# Patient Record
Sex: MALE | Race: WHITE | NOT HISPANIC OR LATINO | URBAN - METROPOLITAN AREA
[De-identification: names, ages, dates, MRNs, and addresses within clinical notes are randomized per-mention and may not be internally consistent; named-entity substitution may affect disease eponyms.]

---

## 2017-01-11 ENCOUNTER — INPATIENT (INPATIENT)
Facility: HOSPITAL | Age: 62
LOS: 2 days | Discharge: HOME | DRG: 42 | End: 2017-01-14
Attending: INTERNAL MEDICINE | Admitting: INTERNAL MEDICINE
Payer: COMMERCIAL

## 2017-01-11 VITALS
DIASTOLIC BLOOD PRESSURE: 96 MMHG | HEART RATE: 57 BPM | TEMPERATURE: 98 F | SYSTOLIC BLOOD PRESSURE: 143 MMHG | WEIGHT: 145.06 LBS | HEIGHT: 66 IN | RESPIRATION RATE: 18 BRPM | OXYGEN SATURATION: 98 %

## 2017-01-11 DIAGNOSIS — I65.29 OCCLUSION AND STENOSIS OF UNSPECIFIED CAROTID ARTERY: ICD-10-CM

## 2017-01-11 DIAGNOSIS — G45.9 TRANSIENT CEREBRAL ISCHEMIC ATTACK, UNSPECIFIED: ICD-10-CM

## 2017-01-11 DIAGNOSIS — G81.91 HEMIPLEGIA, UNSPECIFIED AFFECTING RIGHT DOMINANT SIDE: ICD-10-CM

## 2017-01-11 DIAGNOSIS — C13.9 MALIGNANT NEOPLASM OF HYPOPHARYNX, UNSPECIFIED: ICD-10-CM

## 2017-01-11 DIAGNOSIS — I63.9 CEREBRAL INFARCTION, UNSPECIFIED: ICD-10-CM

## 2017-01-11 DIAGNOSIS — Z98.890 OTHER SPECIFIED POSTPROCEDURAL STATES: Chronic | ICD-10-CM

## 2017-01-11 DIAGNOSIS — Z87.891 PERSONAL HISTORY OF NICOTINE DEPENDENCE: ICD-10-CM

## 2017-01-11 DIAGNOSIS — Z91.041 RADIOGRAPHIC DYE ALLERGY STATUS: ICD-10-CM

## 2017-01-11 DIAGNOSIS — Z88.2 ALLERGY STATUS TO SULFONAMIDES: ICD-10-CM

## 2017-01-11 DIAGNOSIS — Z88.0 ALLERGY STATUS TO PENICILLIN: ICD-10-CM

## 2017-01-11 DIAGNOSIS — Z92.3 PERSONAL HISTORY OF IRRADIATION: ICD-10-CM

## 2017-01-11 DIAGNOSIS — N40.1 BENIGN PROSTATIC HYPERPLASIA WITH LOWER URINARY TRACT SYMPTOMS: ICD-10-CM

## 2017-01-11 LAB
ALBUMIN SERPL ELPH-MCNC: 4.5 G/DL — SIGNIFICANT CHANGE UP (ref 3.3–5.2)
ALP SERPL-CCNC: 72 U/L — SIGNIFICANT CHANGE UP (ref 40–120)
ALT FLD-CCNC: 20 U/L — SIGNIFICANT CHANGE UP
ANION GAP SERPL CALC-SCNC: 10 MMOL/L — SIGNIFICANT CHANGE UP (ref 5–17)
APTT BLD: 33.7 SEC — SIGNIFICANT CHANGE UP (ref 27.5–37.4)
AST SERPL-CCNC: 18 U/L — SIGNIFICANT CHANGE UP
BASOPHILS # BLD AUTO: 0 K/UL — SIGNIFICANT CHANGE UP (ref 0–0.2)
BASOPHILS NFR BLD AUTO: 1 % — SIGNIFICANT CHANGE UP (ref 0–2)
BILIRUB SERPL-MCNC: 0.9 MG/DL — SIGNIFICANT CHANGE UP (ref 0.4–2)
BUN SERPL-MCNC: 16 MG/DL — SIGNIFICANT CHANGE UP (ref 8–20)
CALCIUM SERPL-MCNC: 9.7 MG/DL — SIGNIFICANT CHANGE UP (ref 8.6–10.2)
CHLORIDE SERPL-SCNC: 103 MMOL/L — SIGNIFICANT CHANGE UP (ref 98–107)
CK SERPL-CCNC: 83 U/L — SIGNIFICANT CHANGE UP (ref 30–200)
CO2 SERPL-SCNC: 30 MMOL/L — HIGH (ref 22–29)
CREAT SERPL-MCNC: 0.61 MG/DL — SIGNIFICANT CHANGE UP (ref 0.5–1.3)
EOSINOPHIL # BLD AUTO: 0.1 K/UL — SIGNIFICANT CHANGE UP (ref 0–0.5)
EOSINOPHIL NFR BLD AUTO: 3 % — SIGNIFICANT CHANGE UP (ref 0–6)
GLUCOSE SERPL-MCNC: 100 MG/DL — SIGNIFICANT CHANGE UP (ref 70–115)
HBA1C BLD-MCNC: 5.7 — HIGH (ref 4–5.6)
HCT VFR BLD CALC: 44.3 % — SIGNIFICANT CHANGE UP (ref 42–52)
HGB BLD-MCNC: 15.4 G/DL — SIGNIFICANT CHANGE UP (ref 14–18)
INR BLD: 1.1 RATIO — SIGNIFICANT CHANGE UP (ref 0.88–1.16)
LYMPHOCYTES # BLD AUTO: 0.8 K/UL — LOW (ref 1–4.8)
LYMPHOCYTES # BLD AUTO: 19 % — LOW (ref 20–55)
MCHC RBC-ENTMCNC: 31.2 PG — HIGH (ref 27–31)
MCHC RBC-ENTMCNC: 34.8 G/DL — SIGNIFICANT CHANGE UP (ref 32–36)
MCV RBC AUTO: 89.7 FL — SIGNIFICANT CHANGE UP (ref 80–94)
MONOCYTES # BLD AUTO: 0.4 K/UL — SIGNIFICANT CHANGE UP (ref 0–0.8)
MONOCYTES NFR BLD AUTO: 10 % — SIGNIFICANT CHANGE UP (ref 3–10)
NEUTROPHILS # BLD AUTO: 2.6 K/UL — SIGNIFICANT CHANGE UP (ref 1.8–8)
NEUTROPHILS NFR BLD AUTO: 63 % — SIGNIFICANT CHANGE UP (ref 37–73)
PLATELET # BLD AUTO: 148 K/UL — LOW (ref 150–400)
POTASSIUM SERPL-MCNC: 4.8 MMOL/L — SIGNIFICANT CHANGE UP (ref 3.5–5.3)
POTASSIUM SERPL-SCNC: 4.8 MMOL/L — SIGNIFICANT CHANGE UP (ref 3.5–5.3)
PROT SERPL-MCNC: 7.1 G/DL — SIGNIFICANT CHANGE UP (ref 6.6–8.7)
PROTHROM AB SERPL-ACNC: 12.1 SEC — SIGNIFICANT CHANGE UP (ref 10–13.1)
RBC # BLD: 4.94 M/UL — SIGNIFICANT CHANGE UP (ref 4.6–6.2)
RBC # FLD: 13.3 % — SIGNIFICANT CHANGE UP (ref 11–15.6)
SODIUM SERPL-SCNC: 143 MMOL/L — SIGNIFICANT CHANGE UP (ref 135–145)
TROPONIN T SERPL-MCNC: <0.01 NG/ML — SIGNIFICANT CHANGE UP (ref 0–0.06)
TYPE + AB SCN PNL BLD: SIGNIFICANT CHANGE UP
WBC # BLD: 3.94 K/UL — LOW (ref 4.8–10.8)
WBC # FLD AUTO: 3.94 K/UL — LOW (ref 4.8–10.8)

## 2017-01-11 PROCEDURE — 71010: CPT | Mod: 26

## 2017-01-11 PROCEDURE — 93010 ELECTROCARDIOGRAM REPORT: CPT

## 2017-01-11 PROCEDURE — 99223 1ST HOSP IP/OBS HIGH 75: CPT

## 2017-01-11 PROCEDURE — 70450 CT HEAD/BRAIN W/O DYE: CPT | Mod: 26

## 2017-01-11 PROCEDURE — 70551 MRI BRAIN STEM W/O DYE: CPT | Mod: 26

## 2017-01-11 PROCEDURE — 99285 EMERGENCY DEPT VISIT HI MDM: CPT

## 2017-01-11 RX ORDER — ENOXAPARIN SODIUM 100 MG/ML
40 INJECTION SUBCUTANEOUS DAILY
Qty: 0 | Refills: 0 | Status: DISCONTINUED | OUTPATIENT
Start: 2017-01-11 | End: 2017-01-14

## 2017-01-11 RX ORDER — ASPIRIN/CALCIUM CARB/MAGNESIUM 324 MG
325 TABLET ORAL DAILY
Qty: 0 | Refills: 0 | Status: DISCONTINUED | OUTPATIENT
Start: 2017-01-11 | End: 2017-01-14

## 2017-01-11 RX ORDER — ATORVASTATIN CALCIUM 80 MG/1
40 TABLET, FILM COATED ORAL AT BEDTIME
Qty: 0 | Refills: 0 | Status: DISCONTINUED | OUTPATIENT
Start: 2017-01-11 | End: 2017-01-14

## 2017-01-11 RX ADMIN — ATORVASTATIN CALCIUM 40 MILLIGRAM(S): 80 TABLET, FILM COATED ORAL at 22:56

## 2017-01-11 RX ADMIN — ENOXAPARIN SODIUM 40 MILLIGRAM(S): 100 INJECTION SUBCUTANEOUS at 18:53

## 2017-01-11 RX ADMIN — Medication 325 MILLIGRAM(S): at 18:52

## 2017-01-11 RX ADMIN — Medication 1 MILLIGRAM(S): at 20:48

## 2017-01-11 NOTE — H&P ADULT. - ASSESSMENT
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     r/o CVA: stroke w/up ordered, adm stroke unit, neuro eval Dr Montes De Oca consulted. asa 325, statin.     Prophyl: lovenox

## 2017-01-11 NOTE — PATIENT PROFILE ADULT. - NS TRANSFER PATIENT BELONGINGS
Jewelry/Money (specify)/Clothing/wallet, vasyl stud earing, bracelet, argelia pack/Cell Phone/PDA (specify)

## 2017-01-11 NOTE — CONSULT NOTE ADULT - ASSESSMENT
The patient is a 61y Male with persistent right arm discoordination with concefrn for cerebellar CVA, not seen on CT head

## 2017-01-11 NOTE — DISCHARGE NOTE ADULT - CARE PLAN
Principal Discharge DX:	Cerebrovascular accident (CVA), unspecified mechanism  Goal:	to be fucntional at baseline  Instructions for follow-up, activity and diet:	Follow with PMD in 3 days. Follow with Cardiology  Secondary Diagnosis:	Benign prostatic hyperplasia, presence of lower urinary tract symptoms unspecified, unspecified morphology  Secondary Diagnosis:	Squamous cell cancer of hypopharynx

## 2017-01-11 NOTE — CONSULT NOTE ADULT - PROBLEM SELECTOR RECOMMENDATION 9
MRI head  echocardiogram  CT angio neck ordered  asa, lipitor  PT/OT  will follow  Andrei Montes De Oca MD, PhD   [id number]

## 2017-01-11 NOTE — DISCHARGE NOTE ADULT - PROVIDER TOKENS
FREE:[LAST:[PMD at [facility]],PHONE:[(   )    -],FAX:[(   )    -]],TOKEN:'[id number]',TOKEN:'[id number]'

## 2017-01-11 NOTE — ED PROVIDER NOTE - MEDICAL DECISION MAKING DETAILS
RUE numbness and weakness sudden onset improving in the ER.  Code stroke called.  No tPA given.  Patient admitted for TIA.

## 2017-01-11 NOTE — ED ADULT TRIAGE NOTE - CHIEF COMPLAINT QUOTE
Patient BIBA, patient states at 11:15AM had numbness and weakness to right arm, called 911, upon ED arrival NIH score of 0, states symptoms have resolved with just some tingling to right arm, denies headache or dizziness

## 2017-01-11 NOTE — DISCHARGE NOTE ADULT - HOSPITAL COURSE
PMH of Benign prostatic hyperplasia, presence of lower urinary tract symptoms unspecified, unspecified morphology Squamous cell cancer of hypopharynx. admitted with CVA confirmed by MRI as- Multiple acute small left MCA territory infarcts. DILIA confirmed PFO. got a ILR placed. on asa, plavix and statin. Medically stable and agreeable with discharge and follow up plan. Patient was advised to return to ED if any symptoms occur or worsen.

## 2017-01-11 NOTE — DISCHARGE NOTE ADULT - CARE PROVIDERS DIRECT ADDRESSES
,DirectAddress_Unknown,[email or web address],DirectAddress_Unknown,DirectAddress_Unknown

## 2017-01-11 NOTE — H&P ADULT. - PMH
Benign prostatic hyperplasia, presence of lower urinary tract symptoms unspecified, unspecified morphology    Squamous cell cancer of hypopharynx

## 2017-01-11 NOTE — H&P ADULT. - HISTORY OF PRESENT ILLNESS
61 y M hx squamous cell CA tonsil s/p xrt, neck dissection 2002, presented to ER c/o RUE weakness, paresthesias this am while at work. Pt reports had difficulty raising arm and felt numbness and tingling. Associated with lightheadedness. Denies F/C, CP, SOB, h/a, N/V/D. Presented to ER for further evaluation. Symptoms have improved however still has residual numbness of hand. He is followed by vascular surgeon Dr Logan for L carotid stenosis 50%.

## 2017-01-11 NOTE — DISCHARGE NOTE ADULT - CARE PROVIDER_API CALL
PMD at [facility],   Phone: (   )    -  Fax: (   )    -    Chente Alford), Cardiovascular Disease  [address] [place] [address]  Phone: [phone]  Fax: [phone]    Matthew Moya), Neurology  [address]  [place] [address]  Phone: [phone]  Fax: [phone]

## 2017-01-11 NOTE — ED PROVIDER NOTE - OBJECTIVE STATEMENT
This patient is a 61 year old man who presents to the ED c/o right arm weakness and numbness lasting for about 30 minutes that began suddenly at 11:15 am.  Patient states that he was about to start work at [employer] when the symptoms began.  He denies headache, visual changes or deficits, neck pain, and vomiting.  Patient states that he has never had symptoms like this in the past.  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  Currently in the ER he states that his symptoms are improving but he still has some weakness and numbness to the hand.  He reports that he noticed it was difficult to write when he signed in to the ER.

## 2017-01-11 NOTE — ED PROVIDER NOTE - PROGRESS NOTE DETAILS
Delay in taking patient to CT scan  No nurses at bedside to take patient. I spoke with Dr. Thomas who states that patient is not a candidate for tPA.  He does recommend that patient have CTA of head/neck

## 2017-01-11 NOTE — H&P ADULT. - NEUROLOGICAL DETAILS
gait not assessed, toes downgoing/sensation intact/cranial nerves intact/alert and oriented x 3/normal strength cranial nerves intact/gait not assessed/normal strength/sensation intact/alert and oriented x 3

## 2017-01-11 NOTE — ED ADULT NURSE NOTE - OBJECTIVE STATEMENT
Patient BIBA, patient states at 11:15AM had numbness and weakness to right arm, called 911, upon ED arrival NIH score of 0, states symptoms have resolved with just some tingling to right arm, denies headache or dizziness, c/o mild numbness to right hand fingertips , but has full ROM

## 2017-01-11 NOTE — DISCHARGE NOTE ADULT - MEDICATION SUMMARY - MEDICATIONS TO TAKE
I will START or STAY ON the medications listed below when I get home from the hospital:    aspirin 325 mg oral tablet  -- 1 tab(s) by mouth once a day  -- Indication: For cva    atorvastatin 40 mg oral tablet  -- 1 tab(s) by mouth once a day (at bedtime)  -- Indication: For cva    clopidogrel 75 mg oral tablet  -- 1 tab(s) by mouth once a day  -- Indication: For cva + pfo I will START or STAY ON the medications listed below when I get home from the hospital:    To Whomsoever concerned  -- Patient has been hospitalized at [facility] from  1/11/17 to date. He can resume his regular activities as before from 1/17/17. Please extend your consideration to the patient and family. Feel free to contact me with any questions.    -- Indication: For note    aspirin 325 mg oral tablet  -- 1 tab(s) by mouth once a day  -- Indication: For Cva    atorvastatin 40 mg oral tablet  -- 1 tab(s) by mouth once a day (at bedtime)  -- Indication: For Cva    clopidogrel 75 mg oral tablet  -- 1 tab(s) by mouth once a day  -- Indication: For Cva + pfo

## 2017-01-11 NOTE — DISCHARGE NOTE ADULT - PATIENT PORTAL LINK FT
“You can access the [facility] Patient Portal, offered by [facility], by registering with the following website: [email or web address]”

## 2017-01-12 DIAGNOSIS — I63.9 CEREBRAL INFARCTION, UNSPECIFIED: ICD-10-CM

## 2017-01-12 LAB
CHOLEST SERPL-MCNC: 172 MG/DL — SIGNIFICANT CHANGE UP (ref 110–199)
HCV AB S/CO SERPL IA: 0.07 S/CO — SIGNIFICANT CHANGE UP
HCV AB SERPL-IMP: SIGNIFICANT CHANGE UP
HDLC SERPL-MCNC: 63 MG/DL — SIGNIFICANT CHANGE UP
LIPID PNL WITH DIRECT LDL SERPL: 95 MG/DL — SIGNIFICANT CHANGE UP
TOTAL CHOLESTEROL/HDL RATIO MEASUREMENT: 3 RATIO — LOW (ref 3.4–9.6)
TRIGL SERPL-MCNC: 70 MG/DL — SIGNIFICANT CHANGE UP (ref 10–200)

## 2017-01-12 PROCEDURE — 99222 1ST HOSP IP/OBS MODERATE 55: CPT | Mod: 25

## 2017-01-12 PROCEDURE — 99233 SBSQ HOSP IP/OBS HIGH 50: CPT

## 2017-01-12 PROCEDURE — 93306 TTE W/DOPPLER COMPLETE: CPT | Mod: 26

## 2017-01-12 PROCEDURE — 99253 IP/OBS CNSLTJ NEW/EST LOW 45: CPT

## 2017-01-12 RX ORDER — ACETAMINOPHEN 500 MG
650 TABLET ORAL EVERY 6 HOURS
Qty: 0 | Refills: 0 | Status: DISCONTINUED | OUTPATIENT
Start: 2017-01-12 | End: 2017-01-14

## 2017-01-12 RX ADMIN — Medication 325 MILLIGRAM(S): at 11:35

## 2017-01-12 RX ADMIN — ENOXAPARIN SODIUM 40 MILLIGRAM(S): 100 INJECTION SUBCUTANEOUS at 11:35

## 2017-01-12 RX ADMIN — ATORVASTATIN CALCIUM 40 MILLIGRAM(S): 80 TABLET, FILM COATED ORAL at 21:20

## 2017-01-12 NOTE — PHYSICAL THERAPY INITIAL EVALUATION ADULT - ADDITIONAL COMMENTS
Pt lives with wife in a 2 story house with 2 steps to enter. Independent with all PTA, without devices.

## 2017-01-12 NOTE — PROGRESS NOTE ADULT - SUBJECTIVE AND OBJECTIVE BOX
INTERVAL HPI/OVERNIGHT EVENTS:      CC: acute stroke, carotid stenosis    Chart and course reviewed. Admitted yesterday for right arm weakness, improving at this time. MRI prelim report discussed with patient.     Vital Signs Last 24 Hrs  T(C): 36.6, Max: 37 (01-11 @ 19:55)  T(F): 97.8, Max: 98.6 (01-11 @ 19:55)  HR: 60 (54 - 62)  BP: 117/68 (102/82 - 140/67)  BP(mean): 89 (81 - 105)  RR: 12 (12 - 18)  SpO2: 98% (94% - 100%)    PHYSICAL EXAM:    GENERAL: Not in distress, alert  CHEST/LUNG: b/l air entry  HEART: regular  ABDOMEN: Soft, BS +  EXTREMITIES:  no edema    MEDICATIONS  (STANDING):  aspirin 325milliGRAM(s) Oral daily  enoxaparin Injectable 40milliGRAM(s) SubCutaneous daily  atorvastatin 40milliGRAM(s) Oral at bedtime  methylPREDNISolone 32milliGRAM(s) Oral once  methylPREDNISolone 32milliGRAM(s) Oral once    MEDICATIONS  (PRN):  acetaminophen   Tablet. 650milliGRAM(s) Oral every 6 hours PRN Moderate Pain (4 - 6)      Allergies    IV Contrast (Unknown)  penicillins (Unknown)  sulfa drugs (Unknown)    Intolerances          LABS:                          15.4   3.94  )-----------( 148      ( 11 Jan 2017 14:30 )             44.3     11 Jan 2017 14:30    143    |  103    |  16.0   ----------------------------<  100    4.8     |  30.0   |  0.61     Ca    9.7        11 Jan 2017 14:30    TPro  7.1    /  Alb  4.5    /  TBili  0.9    /  DBili  x      /  AST  18     /  ALT  20     /  AlkPhos  72     11 Jan 2017 14:30    PT/INR - ( 11 Jan 2017 19:24 )   PT: 12.1 sec;   INR: 1.10 ratio         PTT - ( 11 Jan 2017 19:24 )  PTT:33.7 sec      RADIOLOGY & ADDITIONAL TESTS:

## 2017-01-12 NOTE — CONSULT NOTE ADULT - ASSESSMENT
Assessment and Plan:      61 y M hx  L carotid stenosis 50%. (11 Jan 2017 16:08), squamous cell CA,  tonsil s/p xrt, neck dissection 2002, presented to ER c/o RUE weakness, paresthesias. Symptoms have improved .  MRI brain showed  small punctate CVAs in left MCA distribution. Neurology recommended  TTE, DILIA and ILR.  He denied dyspnea, orthopnea, PND, edema, CP, palpitation, syncope, near syncope.    CVA  Carotid Artery Stenosis    >Carotid Atery  Imaging is already ordered  >ASA  >Statin  >DILIA: Alternative including no treatment options, benefits, risks including but not limited perforation, hoarseness bleeding, dysphagia discussed with the patinet. All questions were uanswered. He understood well, He agreed for DILIA. Will arrange for tomorrow.  >ILR to exclude ischaemia EP Consultation requested.

## 2017-01-12 NOTE — CONSULT NOTE ADULT - SUBJECTIVE AND OBJECTIVE BOX
Patient is a 61y old  Male who presents with a chief complaint of RUE weakness (11 Jan 2017 23:05)      HPI:  61 y M hx squamous cell CA tonsil s/p xrt, neck dissection 2002, presented to ER c/o RUE weakness, paresthesias this am while at work. Pt reports had difficulty raising arm and felt numbness and tingling. Associated with lightheadedness. Denies F/C, CP, SOB, h/a, N/V/D; syncope or pre-syncope . Presented to ER for further evaluation. Symptoms have improved however still has residual numbness of hand. Symptoms have improved . Hospital work-up consistent with likely CVA MRI brain showed  small punctate CVAs in left MCA distribution. Neurology recommended  TTE, DILIA and possible  ILR.      PAST MEDICAL & SURGICAL HISTORY:  Benign prostatic hyperplasia, presence of lower urinary tract symptoms unspecified, unspecified morphology  Squamous cell cancer of hypopharynx  History of prostate surgery  H/O arthroscopic knee surgery  Status post neck dissection      PREVIOUS DIAGNOSTIC TESTING:      EKG: SB  first AVB    ECHO  FINDINGS: Pending    STRESS  FINDINGS: N/A          Allergies    IV Contrast (Unknown)  penicillins (Unknown)  sulfa drugs (Unknown)      MEDICATIONS  (STANDING):  aspirin 325milliGRAM(s) Oral daily  enoxaparin Injectable 40milliGRAM(s) SubCutaneous daily  atorvastatin 40milliGRAM(s) Oral at bedtime  methylPREDNISolone 32milliGRAM(s) Oral once  methylPREDNISolone 32milliGRAM(s) Oral once    MEDICATIONS  (PRN):  acetaminophen   Tablet. 650milliGRAM(s) Oral every 6 hours PRN Moderate Pain (4 - 6)      FAMILY HISTORY:  Family history of lung cancer (Father)      SOCIAL HISTORY: Lives with family    CIGARETTES: None    ALCOHOL: social    REVIEW OF SYSTEMS:  CONSTITUTIONAL: No fever, weight loss, or fatigue  EYES: No eye pain, visual disturbances, or discharge  ENMT:  No difficulty hearing, tinnitus, vertigo; No sinus or throat pain  NECK: No pain or stiffness  RESPIRATORY: No cough, wheezing, chills or hemoptysis; No Shortness of Breath  CARDIOVASCULAR: No chest pain, palpitations, passing out, dizziness, or leg swelling  GASTROINTESTINAL: No abdominal or epigastric pain. No nausea, vomiting, or hematemesis; No diarrhea or constipation. No melena or hematochezia.  GENITOURINARY: No dysuria, frequency, hematuria, or incontinence  NEUROLOGICAL: No headaches, memory loss, loss of strength, numbness, or tremors  SKIN: No itching, burning, rashes, or lesions   LYMPH Nodes: No enlarged glands  ENDOCRINE: No heat or cold intolerance; No hair loss  MUSCULOSKELETAL: No joint pain or swelling; No muscle, back, or extremity pain  PSYCHIATRIC: No depression, anxiety, mood swings, or difficulty sleeping  HEME/LYMPH: No easy bruising, or bleeding gums  ALLERY AND IMMUNOLOGIC: No hives or eczema	    Vital Signs Last 24 Hrs  T(C): 36.9, Max: 37 (01-11 @ 19:55)  T(F): 98.4, Max: 98.6 (01-11 @ 19:55)  HR: 55 (54 - 60)  BP: 125/78 (102/82 - 139/71)  BP(mean): 92 (81 - 105)  RR: 14 (12 - 18)  SpO2: 97% (94% - 100%)    Daily     Daily     I&O's Detail      PHYSICAL EXAM:  Appearance: Normal, well nourished	  HEENT:   Normal oral mucosa, PERRL, EOMI, sclera non-icteric	  Lymphatic: No cervical lymphadenopathy  Cardiovascular: Normal S1 S2, No JVD, No cardiac murmurs, No carotid bruits, No peripheral edema  Respiratory: Lungs clear to auscultation	  Psychiatry: A & O x 3, Mood & affect appropriate  Gastrointestinal:  Soft, Non-tender, + BS, no bruits	  Skin: No rashes, No ecchymoses, No cyanosis  Neurologic: Grossly non-focal with full strength in all four extremities  Extremities: Normal range of motion, No clubbing, cyanosis or edema  Vascular: Peripheral pulses palpable 2+ bilaterally      INTERPRETATION OF TELEMETRY:    ECG: SR with 1st AVB    LABS:                        15.4   3.94  )-----------( 148      ( 11 Jan 2017 14:30 )             44.3     11 Jan 2017 14:30    143    |  103    |  16.0   ----------------------------<  100    4.8     |  30.0   |  0.61     Ca    9.7        11 Jan 2017 14:30    TPro  7.1    /  Alb  4.5    /  TBili  0.9    /  DBili  x      /  AST  18     /  ALT  20     /  AlkPhos  72     11 Jan 2017 14:30    CARDIAC MARKERS ( 11 Jan 2017 14:30 )  x     / <0.01 ng/mL / 83 U/L / x     / x          PT/INR - ( 11 Jan 2017 19:24 )   PT: 12.1 sec;   INR: 1.10 ratio         PTT - ( 11 Jan 2017 19:24 )  PTT:33.7 sec    I&O's Summary    BNP  RADIOLOGY & ADDITIONAL STUDIES:    Head CT    No acute intracranial findings.  Mild atrophy and chronic white matter microvascular ischemic changes as   described. Focal asymmetric area of hypodensity in the left frontal lobe   nonhemorrhagic.    Head MRI    Multiple acute small left MCA territory infarcts. Preliminary   report provided by  [facility] Radiologist: Tatiana who spoke with Dr. Griffith and   findings were discussed on 1/11/2017 at 9:58 PM EST .   A/p     61 y M hx  L carotid stenosis 50%. (11 Jan 2017 16:08), squamous cell CA,  tonsil s/p xrt, neck dissection 2002, presented to ER c/o RUE weakness, paresthesias. Symptoms have improved  MRI brain showed  small punctate CVAs in left MCA distribution currently hemodynamically stable.      >Carotid Artery  Imaging pending  >ASA  >Statin  >DILIA:  Pending   If above work-up unremarkable would  recommend LINQ monitor for further risk stratification
CARDIOLOGY CONSULTATION NOTE     History obtained by: Patient, family and medical record     obtained: No    Chief complaint:    Patient is a 61y old  Male who presents with a chief complaint of RUE weakness (11 Jan 2017 23:05)      HPI:    61 y M hx  L carotid stenosis 50%. (11 Jan 2017 16:08), squamous cell CA,  tonsil s/p xrt, neck dissection 2002, presented to ER c/o RUE weakness, paresthesias. Symptoms have improved .  MRI brain showed  small punctate CVAs in left MCA distribution. Neurology recommended  TTE, DILIA and ILR.  He denied dyspnea, orthopnea, PND, edema, CP, palpitation, syncope, near syncope.          REVIEW OF SYMPTOMS:   Constitutional: Denied: fever, chills, weight loss or gain  Eyes: Denied: reddened ayes, eye discharge, eye pain  ENMT: Denied: ear pain, nasal discharge, mouth pain, throat pain or swelling  Cardiovascular: Denied: chest pain,  Chest pressure, palpitation, arrhythmia, irregular or fast heart beats, edema  Respiratory: Denied: cough, phlegm production, wheezes, dyspnea, orthopnea, PND,   GI: Denied: Abdominal pain, nausea, vomiting, diarrhea, constipation, melena, rectal bleed  : Denied: hematuria, frequency  Musculoskeletal: Denied: Muscle aches, weakness, pain  Hematology/lymp: Denied: Bleeding disorder, anemia, blood clotting  Neuro: Denied: Headache, light headedness, dizziness, numbness, aphasia, dysarthria, seizure,  syncope, near syncope  Psych: Denied: depression, anxiety  Integumentary/skin: Denied: rash, bruises, ecchymosis, itching  Allergy/Immunology: denied environmental or drug allergies    ALL OTHER REVIEW OF SYSTEMS ARE NEGATIVE.    MEDICATIONS  (STANDING):  aspirin 325milliGRAM(s) Oral daily  enoxaparin Injectable 40milliGRAM(s) SubCutaneous daily  atorvastatin 40milliGRAM(s) Oral at bedtime  methylPREDNISolone 32milliGRAM(s) Oral once  methylPREDNISolone 32milliGRAM(s) Oral once    MEDICATIONS  (PRN):  acetaminophen   Tablet. 650milliGRAM(s) Oral every 6 hours PRN Moderate Pain (4 - 6)        PAST MEDICAL & SURGICAL HISTORY:  Benign prostatic hyperplasia, presence of lower urinary tract symptoms unspecified, unspecified morphology  Squamous cell cancer of hypopharynx  History of prostate surgery  H/O arthroscopic knee surgery  Status post neck dissection      FAMILY HISTORY:  Family history of lung cancer (Father)      SOCIAL HISTORY:    CIGARETTES:  No    ALCOHOL: No    DRUGS: No    Vital Signs Last 24 Hrs  T(C): 36.9, Max: 37 (01-11 @ 19:55)  T(F): 98.4, Max: 98.6 (01-11 @ 19:55)  HR: 55 (54 - 60)  BP: 125/78 (102/82 - 139/71)  BP(mean): 92 (81 - 105)  RR: 14 (12 - 18)  SpO2: 97% (94% - 100%)    PHYSICAL EXAM:      Constitutional: No fever, chills, NAD, Comfortable    Eyes: Not reddened, no discharge    ENMT: No discharge, No pain    Neck: No JVD, No Bruit    Back: No CVA tenderness    Respiratory: Clear to auscultation bilaterally    Cardiovascular: RRR, Normal S1-2, No S3-, No friction rub murmur    Gastrointestinal: Soft, NT/ND. BS+, No organomegaly    Extremities: No edema    Vascular: Distal pulses intact    Neurological: Alert, awake, oriented, able to move extremities, speach is clear    Skin: No ecchymosis, no rash    Musculoskeletal: Non tender,    Psychiatric: Aproppriate mood, no anxiety        INTERPRETATION OF TELEMETRY:    ECG: SR, 1st degree AV Block,  msc    I&O's Detail      LABS:                        15.4   3.94  )-----------( 148      ( 11 Jan 2017 14:30 )             44.3     11 Jan 2017 14:30    143    |  103    |  16.0   ----------------------------<  100    4.8     |  30.0   |  0.61     Ca    9.7        11 Jan 2017 14:30    TPro  7.1    /  Alb  4.5    /  TBili  0.9    /  DBili  x      /  AST  18     /  ALT  20     /  AlkPhos  72     11 Jan 2017 14:30    CARDIAC MARKERS ( 11 Jan 2017 14:30 )  x     / <0.01 ng/mL / 83 U/L / x     / x          PT/INR - ( 11 Jan 2017 19:24 )   PT: 12.1 sec;   INR: 1.10 ratio         PTT - ( 11 Jan 2017 19:24 )  PTT:33.7 sec    I&O's Summary      RADIOLOGY & ADDITIONAL STUDIES:  X-ray:    PREVIOUS DIAGNOSTIC TESTING:
[facility]                                 Falguni Mccabe, & Amadou                                  [address]                                        [place], [place], [address]                                             [phone]    HISTORY:    The patient is a 61y Male who was at work and noted that he had difficulty controlling his right arm, it is significantly better now, but he still has some issues with it.  He did not have paresthesia or speech/language issues.    PAST MEDICAL & SURGICAL HISTORY:  Benign prostatic hyperplasia, presence of lower urinary tract symptoms unspecified, unspecified morphology  Squamous cell cancer of hypopharynx  History of prostate surgery  H/O arthroscopic knee surgery  Status post neck dissection      MEDICATIONS  (STANDING):  aspirin 325milliGRAM(s) Oral daily  enoxaparin Injectable 40milliGRAM(s) SubCutaneous daily  atorvastatin 40milliGRAM(s) Oral at bedtime    MEDICATIONS  (PRN):      Allergies    IV Contrast (Unknown)  penicillins (Unknown)  sulfa drugs (Unknown)    Intolerances  none      SOCIAL HISTORY:  no tob/etoh/drugs    FAMILY HISTORY:  Family history of lung cancer (Father)      ROS:  The patient denies fevers or weight changes.  Denies headache or dizziness.  Denies chest pain.  Denies shortness of breath.  Denies abdominal pain, nausea, or vomiting.  Denies change in urinary pattern.  Denies rash.  Denies recent mood changes.    Exam:  Vital Signs Last 24 Hrs  T(C): 36.7, Max: 36.7 (01-11 @ 12:12)  T(F): 98.1, Max: 98.1 (01-11 @ 12:12)  HR: 62 (57 - 62)  BP: 140/67 (140/67 - 143/96)  BP(mean): --  RR: 18 (18 - 18)  SpO2: 99% (98% - 99%)  General: NAD    Mental status: The patient is awake, alert, and fully oriented. There is no aphasia.    Cranial nerves: . Pupils react Symmetrically to light. There is no visual field deficit to confrontation. Extraocular motion is full with no nystagmus. There is no ptosis. Facial sensation is intact. Facial musculature is symmetric. Palate elevates symmetrically. Tongue is midline.    Motor: There is normal bulk and tone.  Strength is 5/5 in the right arm and leg.   Strength is 5/5 in the left arm and leg.    Sensation: Intact to light touch and pin.    Reflexes: 2+ throughout and plantar responses are flexor.    Cerebellar: There is mild dysmetria on finger to nose testing on right, normal left    LABS:                         15.4   3.94  )-----------( 148      ( 11 Jan 2017 14:30 )             44.3       11 Jan 2017 14:30    143    |  103    |  16.0   ----------------------------<  100    4.8     |  30.0   |  0.61     Ca    9.7        11 Jan 2017 14:30    TPro  7.1    /  Alb  4.5    /  TBili  0.9    /  DBili  x      /  AST  18     /  ALT  20     /  AlkPhos  72     11 Jan 2017 14:30      PT/INR - ( 11 Jan 2017 19:24 )   PT: 12.1 sec;   INR: 1.10 ratio         PTT - ( 11 Jan 2017 19:24 )  PTT:33.7 sec    RADIOLOGY & ADDITIONAL STUDIES:  CT head no acute CVA, mass or bleed

## 2017-01-12 NOTE — PROGRESS NOTE ADULT - ASSESSMENT
CVA  await TTE, may need DILIA and ILR  ordered carotid duplex    ASA, statin  will follow with you    Andrei Montes De Oca MD PhD   [id number]

## 2017-01-12 NOTE — PROGRESS NOTE ADULT - ASSESSMENT
61 yr old male with prior history of head and neck ca, carotid stenosis admitted with right arm weakness. MRI positive for stroke.

## 2017-01-12 NOTE — OCCUPATIONAL THERAPY INITIAL EVALUATION ADULT - ADDITIONAL COMMENTS
Pt lives in 2 story home with 2 steps to enter.  Pt has tub/shower combo.  No assistive devices prior to admission.

## 2017-01-12 NOTE — PROGRESS NOTE ADULT - SUBJECTIVE AND OBJECTIVE BOX
[facility]                                 Falguni Mccabe, & Amadou                                  [address]                                        [place], [place], [address]                                             [phone]      Vital signs:  T(C): 36.6, Max: 37 (01-11 @ 19:55)  HR: 59 (54 - 62)  BP: 102/82 (102/82 - 143/96)  RR: 12 (12 - 18)  SpO2: 95% (94% - 100%)  Wt(kg): --    Exam:    No new complaints.  Awake and alert.  speech language intact  Pupils react.  Face symmetric smile and sensation  hearing symmetric  tongue ML  5/5 power in 4 ext  no drift  intact FT x 4 ext  mild dysmetria on right FTN improved    MRI brain small punctate CVAs in left MCA distribution

## 2017-01-13 PROCEDURE — 93312 ECHO TRANSESOPHAGEAL: CPT | Mod: 26

## 2017-01-13 PROCEDURE — 93880 EXTRACRANIAL BILAT STUDY: CPT | Mod: 26

## 2017-01-13 PROCEDURE — 70498 CT ANGIOGRAPHY NECK: CPT | Mod: 26

## 2017-01-13 PROCEDURE — 99233 SBSQ HOSP IP/OBS HIGH 50: CPT

## 2017-01-13 PROCEDURE — 70496 CT ANGIOGRAPHY HEAD: CPT | Mod: 26

## 2017-01-13 PROCEDURE — 76376 3D RENDER W/INTRP POSTPROCES: CPT | Mod: 26

## 2017-01-13 PROCEDURE — 33282: CPT

## 2017-01-13 PROCEDURE — 93320 DOPPLER ECHO COMPLETE: CPT | Mod: 26

## 2017-01-13 PROCEDURE — 93325 DOPPLER ECHO COLOR FLOW MAPG: CPT | Mod: 26

## 2017-01-13 RX ADMIN — ATORVASTATIN CALCIUM 40 MILLIGRAM(S): 80 TABLET, FILM COATED ORAL at 21:16

## 2017-01-13 RX ADMIN — Medication 32 MILLIGRAM(S): at 13:53

## 2017-01-13 RX ADMIN — ENOXAPARIN SODIUM 40 MILLIGRAM(S): 100 INJECTION SUBCUTANEOUS at 12:18

## 2017-01-13 RX ADMIN — Medication 325 MILLIGRAM(S): at 12:18

## 2017-01-13 RX ADMIN — Medication 32 MILLIGRAM(S): at 04:09

## 2017-01-13 RX ADMIN — Medication 100 MILLIGRAM(S): at 09:45

## 2017-01-13 NOTE — PROGRESS NOTE ADULT - SUBJECTIVE AND OBJECTIVE BOX
INTERVAL HPI/OVERNIGHT EVENTS:      CC: stroke, head and neck ca    No overnight events, no new complaints.      Vital Signs Last 24 Hrs  T(C): 36.6, Max: 36.6 (01-13 @ 04:24)  T(F): 97.8, Max: 97.8 (01-13 @ 04:24)  HR: 57 (50 - 63)  BP: 119/70 (101/56 - 125/78)  BP(mean): 82 (79 - 92)  RR: 18 (14 - 20)  SpO2: 97% (97% - 99%)    PHYSICAL EXAM:    GENERAL: Not in distress, alert  CHEST/LUNG: b/l air entry  HEART: Regular  ABDOMEN: Soft, BS +  EXTREMITIES:  No edema    MEDICATIONS  (STANDING):  aspirin 325milliGRAM(s) Oral daily  enoxaparin Injectable 40milliGRAM(s) SubCutaneous daily  atorvastatin 40milliGRAM(s) Oral at bedtime  methylPREDNISolone 32milliGRAM(s) Oral once    MEDICATIONS  (PRN):  acetaminophen   Tablet. 650milliGRAM(s) Oral every 6 hours PRN Moderate Pain (4 - 6)      Allergies    IV Contrast (Unknown)  penicillins (Unknown)  sulfa drugs (Unknown)    Intolerances          LABS:                          15.4   3.94  )-----------( 148      ( 11 Jan 2017 14:30 )             44.3     11 Jan 2017 14:30    143    |  103    |  16.0   ----------------------------<  100    4.8     |  30.0   |  0.61     Ca    9.7        11 Jan 2017 14:30    TPro  7.1    /  Alb  4.5    /  TBili  0.9    /  DBili  x      /  AST  18     /  ALT  20     /  AlkPhos  72     11 Jan 2017 14:30    PT/INR - ( 11 Jan 2017 19:24 )   PT: 12.1 sec;   INR: 1.10 ratio         PTT - ( 11 Jan 2017 19:24 )  PTT:33.7 sec      RADIOLOGY & ADDITIONAL TESTS:

## 2017-01-13 NOTE — PROGRESS NOTE ADULT - ATTENDING COMMENTS
Plan of care discussed at length with patient. All questions answered.
Plan of care discussed with patient and RN.

## 2017-01-13 NOTE — PROGRESS NOTE ADULT - PROBLEM SELECTOR PLAN 1
Continue Aspirin and statin. Cardiology evaluation noted, for DILIA and possible ILR placement today. Awaiting CTA neck. PT/OT evaluation noted.
CTA head and neck ordered, Medrol ordered given contrast allergy. Monitor on stroke unit. Continue aspirin and plavix. ECHO. Will consult cardiology for ILR and possible DILIA. He reports having occasional weakness.

## 2017-01-13 NOTE — PROGRESS NOTE ADULT - ASSESSMENT
61 yr old male with prior history of head and neck ca, carotid stenosis admitted with right arm weakness. MRI positive for stroke. Cardiology consulted foe DILIA and possible ILR placement.

## 2017-01-13 NOTE — PROGRESS NOTE ADULT - SUBJECTIVE AND OBJECTIVE BOX
Procedure Note:    DILIA    Informed consent obtained.  Sedation is provided by anesthesia.  Probe passed without difficulty.  Patient tolerated procedure well.  No complication noted.

## 2017-01-14 VITALS — TEMPERATURE: 98 F

## 2017-01-14 LAB — HBA1C BLD-MCNC: 5.6 — SIGNIFICANT CHANGE UP (ref 4–5.6)

## 2017-01-14 PROCEDURE — 71045 X-RAY EXAM CHEST 1 VIEW: CPT

## 2017-01-14 PROCEDURE — 93312 ECHO TRANSESOPHAGEAL: CPT

## 2017-01-14 PROCEDURE — 70551 MRI BRAIN STEM W/O DYE: CPT

## 2017-01-14 PROCEDURE — 93306 TTE W/DOPPLER COMPLETE: CPT

## 2017-01-14 PROCEDURE — 70496 CT ANGIOGRAPHY HEAD: CPT

## 2017-01-14 PROCEDURE — 83036 HEMOGLOBIN GLYCOSYLATED A1C: CPT

## 2017-01-14 PROCEDURE — 97165 OT EVAL LOW COMPLEX 30 MIN: CPT

## 2017-01-14 PROCEDURE — 86901 BLOOD TYPING SEROLOGIC RH(D): CPT

## 2017-01-14 PROCEDURE — 70450 CT HEAD/BRAIN W/O DYE: CPT

## 2017-01-14 PROCEDURE — 85610 PROTHROMBIN TIME: CPT

## 2017-01-14 PROCEDURE — 93005 ELECTROCARDIOGRAM TRACING: CPT

## 2017-01-14 PROCEDURE — 80061 LIPID PANEL: CPT

## 2017-01-14 PROCEDURE — 80053 COMPREHEN METABOLIC PANEL: CPT

## 2017-01-14 PROCEDURE — 93880 EXTRACRANIAL BILAT STUDY: CPT

## 2017-01-14 PROCEDURE — 36415 COLL VENOUS BLD VENIPUNCTURE: CPT

## 2017-01-14 PROCEDURE — 85730 THROMBOPLASTIN TIME PARTIAL: CPT

## 2017-01-14 PROCEDURE — 82550 ASSAY OF CK (CPK): CPT

## 2017-01-14 PROCEDURE — 86850 RBC ANTIBODY SCREEN: CPT

## 2017-01-14 PROCEDURE — 99285 EMERGENCY DEPT VISIT HI MDM: CPT | Mod: 25

## 2017-01-14 PROCEDURE — 97163 PT EVAL HIGH COMPLEX 45 MIN: CPT

## 2017-01-14 PROCEDURE — 70498 CT ANGIOGRAPHY NECK: CPT

## 2017-01-14 PROCEDURE — 99239 HOSP IP/OBS DSCHRG MGMT >30: CPT

## 2017-01-14 PROCEDURE — C1764: CPT

## 2017-01-14 PROCEDURE — 86803 HEPATITIS C AB TEST: CPT

## 2017-01-14 PROCEDURE — 86900 BLOOD TYPING SEROLOGIC ABO: CPT

## 2017-01-14 PROCEDURE — 84484 ASSAY OF TROPONIN QUANT: CPT

## 2017-01-14 PROCEDURE — 93325 DOPPLER ECHO COLOR FLOW MAPG: CPT

## 2017-01-14 PROCEDURE — 85027 COMPLETE CBC AUTOMATED: CPT

## 2017-01-14 PROCEDURE — 93320 DOPPLER ECHO COMPLETE: CPT

## 2017-01-14 RX ORDER — CLOPIDOGREL BISULFATE 75 MG/1
75 TABLET, FILM COATED ORAL DAILY
Qty: 0 | Refills: 0 | Status: DISCONTINUED | OUTPATIENT
Start: 2017-01-14 | End: 2017-01-14

## 2017-01-14 RX ORDER — CLOPIDOGREL BISULFATE 75 MG/1
1 TABLET, FILM COATED ORAL
Qty: 30 | Refills: 0 | OUTPATIENT
Start: 2017-01-14

## 2017-01-14 RX ORDER — ATORVASTATIN CALCIUM 80 MG/1
1 TABLET, FILM COATED ORAL
Qty: 30 | Refills: 0 | OUTPATIENT
Start: 2017-01-14

## 2017-01-14 RX ORDER — ASPIRIN/CALCIUM CARB/MAGNESIUM 324 MG
1 TABLET ORAL
Qty: 30 | Refills: 0 | OUTPATIENT
Start: 2017-01-14

## 2017-01-14 RX ADMIN — Medication 325 MILLIGRAM(S): at 11:58

## 2017-01-14 RX ADMIN — Medication 650 MILLIGRAM(S): at 04:53

## 2017-01-14 RX ADMIN — Medication 650 MILLIGRAM(S): at 06:24

## 2017-01-14 RX ADMIN — CLOPIDOGREL BISULFATE 75 MILLIGRAM(S): 75 TABLET, FILM COATED ORAL at 17:11

## 2017-01-14 RX ADMIN — ENOXAPARIN SODIUM 40 MILLIGRAM(S): 100 INJECTION SUBCUTANEOUS at 11:58

## 2017-01-14 NOTE — PROGRESS NOTE ADULT - SUBJECTIVE AND OBJECTIVE BOX
[facility]                                 Falguni Mccabe, & Amadou                                  [address]                                        [place], [place], [address]                                             [phone]        No new complaints.    Awake and alert.  Pupils react.  Face symmetric.  Good strength bilaterally.    Carotid duplex negative.  CT-A neck/brainnegative.    DILIA reports PFO  (Size not mentioned).

## 2017-01-14 NOTE — PROGRESS NOTE ADULT - SUBJECTIVE AND OBJECTIVE BOX
HEALTH ISSUES - PROBLEM Dx: 61 yr old male with prior history of head and neck ca, carotid stenosis admitted with right arm weakness. MRI positive for stroke. Cardiology consulted foe OTIS and possible ILR placement.  multiple infarcts cva    PAST MEDICAL & SURGICAL HISTORY:  Benign prostatic hyperplasia, presence of lower urinary tract symptoms unspecified, unspecified morphology  Squamous cell cancer of hypopharynx  History of prostate surgery  H/O arthroscopic knee surgery  Status post neck dissection      INTERVAL HPI/ OVERNIGHT EVENTS:  comfortable. s/p ilr and otis yest  denies chest pain, nausea, vomits, cough, SOB, fever, HA    MEDICATIONS  (STANDING):  aspirin 325milliGRAM(s) Oral daily  enoxaparin Injectable 40milliGRAM(s) SubCutaneous daily  atorvastatin 40milliGRAM(s) Oral at bedtime  clopidogrel Tablet 75milliGRAM(s) Oral daily    MEDICATIONS  (PRN):  acetaminophen   Tablet. 650milliGRAM(s) Oral every 6 hours PRN Moderate Pain (4 - 6)      Allergies    IV Contrast (Unknown)  penicillins (Unknown)  sulfa drugs (Unknown)    Intolerances        Vital Signs Last 24 Hrs  T(C): 36.9, Max: 37.2 (01-13 @ 19:32)  T(F): 98.4, Max: 98.9 (01-13 @ 19:32)  HR: 54 (50 - 67)  BP: 115/71 (109/76 - 130/67)  BP(mean): 85 (85 - 85)  RR: 20 (18 - 29)  SpO2: 100% (97% - 100%)    ACCUCHECKS    PHYSICAL EXAM-  GENERAL: NAD, well-groomed, well-developed  HEAD:  Atraumatic, Normocephalic  EYES: EOMI, PERRLA, conjunctiva and sclera clear  ENMT: No tonsillar erythema, exudates, or enlargement; Moist mucous membranes, Good dentition, No lesions  NECK: Supple, No JVD, Normal thyroid  NERVOUS SYSTEM:  Alert & Oriented X 3, Motor Strength 5/5 B/L upper and lower extremities; DTRs 2+ intact and symmetric  CHEST/LUNG: Clear to percussion bilaterally; No rales, rhonchi, wheezing, or rubs  HEART: Regular rate and rhythm; No murmurs, rubs, or gallops  ABDOMEN: Soft, Nontender, Nondistended; Bowel sounds present  EXTREMITIES:  2+ Peripheral Pulses, No clubbing, cyanosis, or edema  LYMPH: No lymphadenopathy noted  SKIN: No rashes or lesions    LABS:      RADIOLOGY & ADDITIONAL TESTS:    Assessment and Plan  1. CVA- asa, statin.   2. PFO on otis- d/w supariwala. to add plavix.   3. head and neck ca- stable.    DVT Prophylaxis- icd    Discussed with: Patient, family, RN, CM, Consultants  Plan of care/ Discharge planning discussed. with whole family. d/c meds reviewed with all.     Visit Time: 45 mins

## 2017-01-14 NOTE — PROGRESS NOTE ADULT - ASSESSMENT
The patient is a 61y Male status post CVA.    On antiplatelet and statin at present.    Will need cardiology follow up regarding PFO although usually if small treatment is with antiplatelet.    Discharge planning.

## 2017-01-18 PROBLEM — Z00.00 ENCOUNTER FOR PREVENTIVE HEALTH EXAMINATION: Noted: 2017-01-18

## 2017-01-24 ENCOUNTER — NON-APPOINTMENT (OUTPATIENT)
Age: 62
End: 2017-01-24

## 2017-01-24 ENCOUNTER — APPOINTMENT (OUTPATIENT)
Dept: ELECTROPHYSIOLOGY | Facility: CLINIC | Age: 62
End: 2017-01-24

## 2017-01-24 ENCOUNTER — APPOINTMENT (OUTPATIENT)
Dept: CARDIOLOGY | Facility: CLINIC | Age: 62
End: 2017-01-24

## 2017-01-24 VITALS
HEIGHT: 66.5 IN | OXYGEN SATURATION: 98 % | HEART RATE: 60 BPM | BODY MASS INDEX: 23.03 KG/M2 | SYSTOLIC BLOOD PRESSURE: 139 MMHG | DIASTOLIC BLOOD PRESSURE: 85 MMHG | WEIGHT: 145 LBS

## 2017-01-24 DIAGNOSIS — Z85.828 PERSONAL HISTORY OF OTHER MALIGNANT NEOPLASM OF SKIN: ICD-10-CM

## 2017-01-24 DIAGNOSIS — Z98.890 OTHER SPECIFIED POSTPROCEDURAL STATES: ICD-10-CM

## 2017-01-24 DIAGNOSIS — Z87.891 PERSONAL HISTORY OF NICOTINE DEPENDENCE: ICD-10-CM

## 2017-01-24 DIAGNOSIS — K40.90 UNILATERAL INGUINAL HERNIA, W/OUT OBSTRUCTION OR GANGRENE, NOT SPECIFIED AS RECURRENT: ICD-10-CM

## 2017-01-24 DIAGNOSIS — Z86.73 PERSONAL HISTORY OF TRANSIENT ISCHEMIC ATTACK (TIA), AND CEREBRAL INFARCTION W/OUT RESIDUAL DEFICITS: ICD-10-CM

## 2017-01-24 DIAGNOSIS — N40.0 BENIGN PROSTATIC HYPERPLASIA WITHOUT LOWER URINARY TRACT SYMPMS: ICD-10-CM

## 2017-02-13 ENCOUNTER — APPOINTMENT (OUTPATIENT)
Dept: ELECTROPHYSIOLOGY | Facility: CLINIC | Age: 62
End: 2017-02-13

## 2017-03-17 ENCOUNTER — APPOINTMENT (OUTPATIENT)
Dept: ELECTROPHYSIOLOGY | Facility: CLINIC | Age: 62
End: 2017-03-17

## 2017-03-21 ENCOUNTER — APPOINTMENT (OUTPATIENT)
Dept: CARDIOLOGY | Facility: CLINIC | Age: 62
End: 2017-03-21

## 2017-03-29 ENCOUNTER — MEDICATION RENEWAL (OUTPATIENT)
Age: 62
End: 2017-03-29

## 2017-03-29 VITALS — SYSTOLIC BLOOD PRESSURE: 117 MMHG | DIASTOLIC BLOOD PRESSURE: 69 MMHG | OXYGEN SATURATION: 98 % | HEART RATE: 57 BPM

## 2017-04-24 ENCOUNTER — APPOINTMENT (OUTPATIENT)
Dept: ELECTROPHYSIOLOGY | Facility: CLINIC | Age: 62
End: 2017-04-24

## 2017-05-01 ENCOUNTER — APPOINTMENT (OUTPATIENT)
Dept: ELECTROPHYSIOLOGY | Facility: CLINIC | Age: 62
End: 2017-05-01

## 2017-05-02 ENCOUNTER — APPOINTMENT (OUTPATIENT)
Dept: CARDIOLOGY | Facility: CLINIC | Age: 62
End: 2017-05-02

## 2017-05-02 ENCOUNTER — NON-APPOINTMENT (OUTPATIENT)
Age: 62
End: 2017-05-02

## 2017-05-02 VITALS
WEIGHT: 142 LBS | DIASTOLIC BLOOD PRESSURE: 84 MMHG | HEART RATE: 51 BPM | SYSTOLIC BLOOD PRESSURE: 128 MMHG | BODY MASS INDEX: 22.55 KG/M2 | OXYGEN SATURATION: 97 % | HEIGHT: 66.5 IN

## 2017-05-02 VITALS — DIASTOLIC BLOOD PRESSURE: 85 MMHG | SYSTOLIC BLOOD PRESSURE: 125 MMHG

## 2017-05-18 ENCOUNTER — APPOINTMENT (OUTPATIENT)
Dept: CARDIOLOGY | Facility: CLINIC | Age: 62
End: 2017-05-18

## 2017-05-18 VITALS
HEIGHT: 66.5 IN | OXYGEN SATURATION: 98 % | DIASTOLIC BLOOD PRESSURE: 74 MMHG | HEART RATE: 52 BPM | SYSTOLIC BLOOD PRESSURE: 120 MMHG

## 2017-05-26 ENCOUNTER — APPOINTMENT (OUTPATIENT)
Dept: ELECTROPHYSIOLOGY | Facility: CLINIC | Age: 62
End: 2017-05-26

## 2017-06-02 ENCOUNTER — APPOINTMENT (OUTPATIENT)
Dept: ELECTROPHYSIOLOGY | Facility: CLINIC | Age: 62
End: 2017-06-02

## 2017-06-08 ENCOUNTER — MEDICATION RENEWAL (OUTPATIENT)
Age: 62
End: 2017-06-08

## 2017-06-30 ENCOUNTER — APPOINTMENT (OUTPATIENT)
Dept: ELECTROPHYSIOLOGY | Facility: CLINIC | Age: 62
End: 2017-06-30

## 2017-07-03 ENCOUNTER — OUTPATIENT (OUTPATIENT)
Dept: OUTPATIENT SERVICES | Facility: HOSPITAL | Age: 62
LOS: 1 days | End: 2017-07-03
Payer: COMMERCIAL

## 2017-07-03 VITALS
HEART RATE: 50 BPM | SYSTOLIC BLOOD PRESSURE: 114 MMHG | RESPIRATION RATE: 18 BRPM | OXYGEN SATURATION: 98 % | DIASTOLIC BLOOD PRESSURE: 75 MMHG | TEMPERATURE: 99 F

## 2017-07-03 DIAGNOSIS — Z98.890 OTHER SPECIFIED POSTPROCEDURAL STATES: Chronic | ICD-10-CM

## 2017-07-03 DIAGNOSIS — Z01.810 ENCOUNTER FOR PREPROCEDURAL CARDIOVASCULAR EXAMINATION: ICD-10-CM

## 2017-07-03 LAB
ALBUMIN SERPL ELPH-MCNC: 4.2 G/DL — SIGNIFICANT CHANGE UP (ref 3.3–5.2)
ALP SERPL-CCNC: 81 U/L — SIGNIFICANT CHANGE UP (ref 40–120)
ALT FLD-CCNC: 24 U/L — SIGNIFICANT CHANGE UP
ANION GAP SERPL CALC-SCNC: 12 MMOL/L — SIGNIFICANT CHANGE UP (ref 5–17)
AST SERPL-CCNC: 19 U/L — SIGNIFICANT CHANGE UP
BASOPHILS # BLD AUTO: 0 K/UL — SIGNIFICANT CHANGE UP (ref 0–0.2)
BILIRUB SERPL-MCNC: 1.4 MG/DL — SIGNIFICANT CHANGE UP (ref 0.4–2)
BUN SERPL-MCNC: 16 MG/DL — SIGNIFICANT CHANGE UP (ref 8–20)
CALCIUM SERPL-MCNC: 9.4 MG/DL — SIGNIFICANT CHANGE UP (ref 8.6–10.2)
CHLORIDE SERPL-SCNC: 104 MMOL/L — SIGNIFICANT CHANGE UP (ref 98–107)
CO2 SERPL-SCNC: 27 MMOL/L — SIGNIFICANT CHANGE UP (ref 22–29)
CREAT SERPL-MCNC: 0.61 MG/DL — SIGNIFICANT CHANGE UP (ref 0.5–1.3)
EOSINOPHIL # BLD AUTO: 0.2 K/UL — SIGNIFICANT CHANGE UP (ref 0–0.5)
EOSINOPHIL NFR BLD AUTO: 4 % — SIGNIFICANT CHANGE UP (ref 0–6)
GLUCOSE SERPL-MCNC: 89 MG/DL — SIGNIFICANT CHANGE UP (ref 70–115)
HCT VFR BLD CALC: 41.7 % — LOW (ref 42–52)
HGB BLD-MCNC: 14.5 G/DL — SIGNIFICANT CHANGE UP (ref 14–18)
HIV 1 & 2 AB SERPL IA.RAPID: SIGNIFICANT CHANGE UP
LYMPHOCYTES # BLD AUTO: 0.7 K/UL — LOW (ref 1–4.8)
LYMPHOCYTES # BLD AUTO: 19 % — LOW (ref 20–55)
MCHC RBC-ENTMCNC: 31.5 PG — HIGH (ref 27–31)
MCHC RBC-ENTMCNC: 34.8 G/DL — SIGNIFICANT CHANGE UP (ref 32–36)
MCV RBC AUTO: 90.7 FL — SIGNIFICANT CHANGE UP (ref 80–94)
MONOCYTES NFR BLD AUTO: 8 % — SIGNIFICANT CHANGE UP (ref 3–10)
NEUTROPHILS # BLD AUTO: 2.6 K/UL — SIGNIFICANT CHANGE UP (ref 1.8–8)
NEUTROPHILS NFR BLD AUTO: 67 % — SIGNIFICANT CHANGE UP (ref 37–73)
NEUTS BAND # BLD: 2 % — SIGNIFICANT CHANGE UP (ref 0–8)
PLAT MORPH BLD: NORMAL — SIGNIFICANT CHANGE UP
PLATELET # BLD AUTO: 160 K/UL — SIGNIFICANT CHANGE UP (ref 150–400)
POTASSIUM SERPL-MCNC: 4.2 MMOL/L — SIGNIFICANT CHANGE UP (ref 3.5–5.3)
POTASSIUM SERPL-SCNC: 4.2 MMOL/L — SIGNIFICANT CHANGE UP (ref 3.5–5.3)
PROT SERPL-MCNC: 6.2 G/DL — LOW (ref 6.6–8.7)
RBC # BLD: 4.6 M/UL — SIGNIFICANT CHANGE UP (ref 4.6–6.2)
RBC # FLD: 13.2 % — SIGNIFICANT CHANGE UP (ref 11–15.6)
RBC BLD AUTO: NORMAL — SIGNIFICANT CHANGE UP
SODIUM SERPL-SCNC: 143 MMOL/L — SIGNIFICANT CHANGE UP (ref 135–145)
WBC # BLD: 3.8 K/UL — LOW (ref 4.8–10.8)
WBC # FLD AUTO: 3.8 K/UL — LOW (ref 4.8–10.8)

## 2017-07-03 PROCEDURE — 86803 HEPATITIS C AB TEST: CPT

## 2017-07-03 PROCEDURE — 93010 ELECTROCARDIOGRAM REPORT: CPT

## 2017-07-03 PROCEDURE — 86703 HIV-1/HIV-2 1 RESULT ANTBDY: CPT

## 2017-07-03 PROCEDURE — G0463: CPT

## 2017-07-03 PROCEDURE — 93005 ELECTROCARDIOGRAM TRACING: CPT

## 2017-07-03 PROCEDURE — 80053 COMPREHEN METABOLIC PANEL: CPT

## 2017-07-03 PROCEDURE — 36415 COLL VENOUS BLD VENIPUNCTURE: CPT

## 2017-07-03 PROCEDURE — 85027 COMPLETE CBC AUTOMATED: CPT

## 2017-07-03 NOTE — H&P PST ADULT - HISTORY OF PRESENT ILLNESS
Patient is a 61 year old male who presents to [facility] prior to a RHC and PFO closure. Pmhx includes squamous cell, tonsil cancer s/p XRT and resection in 2002. Several months ago presented with neuro symptoms which concluded that he had CVA's in the left MCA. A TTE was unremarkable but a DILIA in showed a PFO. with an EF 65-70%. A loop was placed to monitor for Afiband carotid dopplers showed mild to moderate bilateral disease. Plan was made to have PFO closure.

## 2017-07-04 LAB
HCV AB S/CO SERPL IA: 0.09 S/CO — SIGNIFICANT CHANGE UP
HCV AB SERPL-IMP: SIGNIFICANT CHANGE UP

## 2017-07-07 ENCOUNTER — INPATIENT (INPATIENT)
Facility: HOSPITAL | Age: 62
LOS: 0 days | Discharge: HOME | DRG: 229 | End: 2017-07-08
Attending: INTERNAL MEDICINE | Admitting: INTERNAL MEDICINE
Payer: COMMERCIAL

## 2017-07-07 ENCOUNTER — APPOINTMENT (OUTPATIENT)
Dept: ELECTROPHYSIOLOGY | Facility: CLINIC | Age: 62
End: 2017-07-07

## 2017-07-07 VITALS
DIASTOLIC BLOOD PRESSURE: 81 MMHG | HEART RATE: 47 BPM | SYSTOLIC BLOOD PRESSURE: 149 MMHG | OXYGEN SATURATION: 100 % | RESPIRATION RATE: 16 BRPM | TEMPERATURE: 98 F

## 2017-07-07 DIAGNOSIS — Z98.890 OTHER SPECIFIED POSTPROCEDURAL STATES: Chronic | ICD-10-CM

## 2017-07-07 DIAGNOSIS — Q21.1 ATRIAL SEPTAL DEFECT: ICD-10-CM

## 2017-07-07 LAB
APTT BLD: 33.8 SEC — SIGNIFICANT CHANGE UP (ref 27.5–37.4)
BLD GP AB SCN SERPL QL: SIGNIFICANT CHANGE UP
INR BLD: 1.04 RATIO — SIGNIFICANT CHANGE UP (ref 0.88–1.16)
PROTHROM AB SERPL-ACNC: 11.5 SEC — SIGNIFICANT CHANGE UP (ref 9.8–12.7)
TYPE + AB SCN PNL BLD: SIGNIFICANT CHANGE UP

## 2017-07-07 PROCEDURE — 93306 TTE W/DOPPLER COMPLETE: CPT | Mod: 26

## 2017-07-07 PROCEDURE — 93580 TRANSCATH CLOSURE OF ASD: CPT

## 2017-07-07 PROCEDURE — 93662 INTRACARDIAC ECG (ICE): CPT | Mod: 26

## 2017-07-07 RX ORDER — DIPHENHYDRAMINE HCL 50 MG
25 CAPSULE ORAL ONCE
Qty: 0 | Refills: 0 | Status: DISCONTINUED | OUTPATIENT
Start: 2017-07-07 | End: 2017-07-07

## 2017-07-07 RX ORDER — ASPIRIN/CALCIUM CARB/MAGNESIUM 324 MG
81 TABLET ORAL ONCE
Qty: 0 | Refills: 0 | Status: COMPLETED | OUTPATIENT
Start: 2017-07-07 | End: 2017-07-07

## 2017-07-07 RX ORDER — HYDROCORTISONE 20 MG
100 TABLET ORAL ONCE
Qty: 0 | Refills: 0 | Status: COMPLETED | OUTPATIENT
Start: 2017-07-07 | End: 2017-07-07

## 2017-07-07 RX ORDER — VANCOMYCIN HCL 1 G
1000 VIAL (EA) INTRAVENOUS ONCE
Qty: 0 | Refills: 0 | Status: COMPLETED | OUTPATIENT
Start: 2017-07-07 | End: 2017-07-07

## 2017-07-07 RX ORDER — CLONAZEPAM 1 MG
0.5 TABLET ORAL
Qty: 0 | Refills: 0 | Status: DISCONTINUED | OUTPATIENT
Start: 2017-07-07 | End: 2017-07-08

## 2017-07-07 RX ORDER — FAMOTIDINE 10 MG/ML
20 INJECTION INTRAVENOUS ONCE
Qty: 0 | Refills: 0 | Status: COMPLETED | OUTPATIENT
Start: 2017-07-07 | End: 2017-07-07

## 2017-07-07 RX ORDER — ASPIRIN/CALCIUM CARB/MAGNESIUM 324 MG
81 TABLET ORAL DAILY
Qty: 0 | Refills: 0 | Status: DISCONTINUED | OUTPATIENT
Start: 2017-07-08 | End: 2017-07-08

## 2017-07-07 RX ORDER — CLOPIDOGREL BISULFATE 75 MG/1
75 TABLET, FILM COATED ORAL ONCE
Qty: 0 | Refills: 0 | Status: COMPLETED | OUTPATIENT
Start: 2017-07-07 | End: 2017-07-07

## 2017-07-07 RX ORDER — CLOPIDOGREL BISULFATE 75 MG/1
75 TABLET, FILM COATED ORAL DAILY
Qty: 0 | Refills: 0 | Status: DISCONTINUED | OUTPATIENT
Start: 2017-07-08 | End: 2017-07-08

## 2017-07-07 RX ORDER — ATORVASTATIN CALCIUM 80 MG/1
20 TABLET, FILM COATED ORAL AT BEDTIME
Qty: 0 | Refills: 0 | Status: DISCONTINUED | OUTPATIENT
Start: 2017-07-07 | End: 2017-07-08

## 2017-07-07 RX ORDER — ZOLPIDEM TARTRATE 10 MG/1
5 TABLET ORAL AT BEDTIME
Qty: 0 | Refills: 0 | Status: DISCONTINUED | OUTPATIENT
Start: 2017-07-07 | End: 2017-07-08

## 2017-07-07 RX ORDER — SODIUM CHLORIDE 9 MG/ML
1000 INJECTION INTRAMUSCULAR; INTRAVENOUS; SUBCUTANEOUS
Qty: 0 | Refills: 0 | Status: DISCONTINUED | OUTPATIENT
Start: 2017-07-07 | End: 2017-07-08

## 2017-07-07 RX ORDER — ALPRAZOLAM 0.25 MG
0.25 TABLET ORAL EVERY 8 HOURS
Qty: 0 | Refills: 0 | Status: DISCONTINUED | OUTPATIENT
Start: 2017-07-07 | End: 2017-07-08

## 2017-07-07 RX ADMIN — Medication 81 MILLIGRAM(S): at 10:30

## 2017-07-07 RX ADMIN — ATORVASTATIN CALCIUM 20 MILLIGRAM(S): 80 TABLET, FILM COATED ORAL at 21:55

## 2017-07-07 RX ADMIN — FAMOTIDINE 20 MILLIGRAM(S): 10 INJECTION INTRAVENOUS at 10:45

## 2017-07-07 RX ADMIN — Medication 0.25 MILLIGRAM(S): at 21:55

## 2017-07-07 RX ADMIN — Medication 0.5 MILLIGRAM(S): at 16:00

## 2017-07-07 RX ADMIN — Medication 250 MILLIGRAM(S): at 21:55

## 2017-07-07 RX ADMIN — Medication 100 MILLIGRAM(S): at 10:45

## 2017-07-07 RX ADMIN — CLOPIDOGREL BISULFATE 75 MILLIGRAM(S): 75 TABLET, FILM COATED ORAL at 10:45

## 2017-07-07 NOTE — DISCHARGE NOTE ADULT - PLAN OF CARE
to remain free from symptoms Patient is s/p PFO closure. No heavy lifting, driving, sex, tub baths, swimming, or any activity that submerges the lower half of the body in water for 48 hours.  Limited walking and stairs for 48 hours.    Change the band-aid after 24 hours and every 24 hours after that.  Keep the puncture site dry and covered with a band-aid until a scab forms.    Observe the site frequently.  If bleeding or a large lump (the size of a golf ball or bigger) occurs lie flat, apply continuous direct pressure just above the puncture site for at least 10 minutes, and notify your physician immediately.  If the bleeding cannot be controlled, call 911 immediately for assistance.  Notify your physician of pain, swelling or any drainage.    Notify your physician immediately if coldness, numbness, discoloration or pain in your foot occurs.

## 2017-07-07 NOTE — DISCHARGE NOTE ADULT - MEDICATION SUMMARY - MEDICATIONS TO STOP TAKING
I will STOP taking the medications listed below when I get home from the hospital:    To Whomsoever concerned  -- Patient has been hospitalized at [facility] from  1/11/17 to date. He can resume his regular activities as before from 1/17/17. Please extend your consideration to the patient and family. Feel free to contact me with any questions. I will STOP taking the medications listed below when I get home from the hospital:    To Whomsoever concerned  -- Patient has been hospitalized at [facility] from  1/11/17 to date. He can resume his regular activities as before from 1/17/17. Please extend your consideration to the patient and family. Feel free to contact me with any questions.

## 2017-07-07 NOTE — DISCHARGE NOTE ADULT - MEDICATION SUMMARY - MEDICATIONS TO TAKE
I will START or STAY ON the medications listed below when I get home from the hospital:    aspirin 81 mg oral tablet, chewable  -- 1 tab(s) by mouth once a day  -- Indication: For Antiplatelet    KlonoPIN 0.25 mg oral tablet  --  by mouth once a day  -- Indication: For Anxiety    Lipitor 20 mg oral tablet  -- 1 tab(s) by mouth once a day  -- Indication: For cholesterol    Plavix 75 mg oral tablet  -- 1 tab(s) by mouth once a day  -- Indication: For Antiplatelet    melatonin 3 mg oral tablet  -- 1 tab(s) by mouth once (at bedtime)  -- Indication: For sleep    D3 1000 oral tablet  -- 1 tab(s) by mouth once a day  -- Indication: For supplement

## 2017-07-07 NOTE — DISCHARGE NOTE ADULT - HOSPITAL COURSE
Patient is s/p rhc and pfo closure. patient to have vanco x1 post op evening, continue on plavix and asa at home. tte the night of procedure. likely discharge in am if groin stable. Patient is s/p rhc and pfo closure. patient to have vanco x1 post op evening, continue on plavix and asa at home. tte the night of procedure. likely discharge in am if groin stable.   Patient received A/O X 3 VSS, PE WNL Lungs CTA, Abdomen soft NT/ND BS X 4, Right groin benign, positive PP bilaterally. D/C home on plavix and aspirin protocol.  D/C reviewed pt verbalizes understanding. F/U with Dr Mccabe as outpatient in 1-2 weeks. Patient is s/p rhc and pfo closure. patient to have vanco x1 post op evening, continue on plavix and asa at home. tte the night of procedure. likely discharge in am if groin stable.   Patient received A/O X 3 VSS, PE WNL Lungs CTA, Abdomen soft NT/ND BS X 4, Right groin benign, positive PP bilaterally. D/C home on plavix and aspirin protocol.  D/C reviewed pt verbalizes understanding. F/U with Dr Mccabe as outpatient in 1-2 weeks.    Post EKG NSB tinted t's v4-v6.  No acute changes noted.

## 2017-07-07 NOTE — DISCHARGE NOTE ADULT - NS AS ACTIVITY OBS
Do not drive or operate machinery/Return to Work/School allowed/Walking-Indoors allowed/No Heavy lifting/straining/Showering allowed

## 2017-07-07 NOTE — DISCHARGE NOTE ADULT - PATIENT PORTAL LINK FT
“You can access the Follow[facility] Patient Portal, offered by [facility], by registering with the following website: [email or web address]”

## 2017-07-07 NOTE — DISCHARGE NOTE ADULT - ADDITIONAL INSTRUCTIONS
Please follow up with your Cardiologist in 1-2 weeks. Please follow up with your Cardiologist in 1-2 weeks.  You will need Aspirin for life  Plavix for 6 Months   Antibiotic prophylaxis for 6 months

## 2017-07-07 NOTE — PROGRESS NOTE ADULT - SUBJECTIVE AND OBJECTIVE BOX
Nurse Practitioner Progress note: s/p RHC and PFO closure.     INTERVAL HISTORY: Patient is a 61 year old male who presents for a RHC and PFO closure. Pmhx includes squamous cell, tonsil cancer s/p XRT and resection in 2002. Several months ago presented with neuro symptoms which after work up, concluded that he had CVA's in the left MCA. A TTE was unremarkable but a DILIA in showed a PFO. with an EF 65-70%. A loop was placed to monitor for Afib and carotid dopplers showed mild to moderate bilateral disease. Plan was made to have PFO closure.       MEDICATIONS:    sodium chloride 0.9%. 1000 milliLiter(s) IV Continuous <Continuous      TELEMETRY: NSR    T(C): 36.6 (07-07-17 @ 10:35), Max: 36.6 (07-07-17 @ 10:35)  HR: 50 (07-07-17 @ 13:30) (44 - 50)  BP: 130/70 (07-07-17 @ 13:30) (127/72 - 149/81)  RR: 16 (07-07-17 @ 13:30) (16 - 18)  SpO2: 99% (07-07-17 @ 13:30) (96% - 100%)  Wt(kg): --    PHYSICAL EXAM:  Appearance: Normal	  HEENT:   Normal oral mucosa, PERRL  Cardiovascular: Normal S1 S2, No JVD, No murmurs, No edema  Respiratory: Lungs clear to auscultation	  Psychiatry: A & O x 3, Mood & affect appropriate  Gastrointestinal:  Soft, Non-tender, + BS	  Skin: No rashes, No ecchymoses, No cyanosis  Neurologic: Non-focal, A&O X3.  No neuro deficits  Extremities: Normal range of motion, No clubbing, cyanosis or edema  Vascular: Peripheral pulses palpable 2+ bilaterally  Procedure Site:  :     PROCEDURE RESULTS: Patient s/p successful closure of PFO. No complications noted.     ASSESSMENT/PLAN: 	  -Groin precaution bed rest when sheaths in place and for 6 hours after  -Bedrest 6 hours after sheaths removed  -Resume home meds  -TTE this evening  -Vanco 1gm iv z1 this evening for ppx.   -Initiate patient will need asa 81mg daily and plavix 75 mg po toya to start on 7/8. Patient has both at home.   -Follow up with Cardiology in 1-2 weeks  -Check labs/EKG/site check in AM  -Probable discharge in AM if groin stable. Nurse Practitioner Progress note: s/p RHC and PFO closure.     INTERVAL HISTORY: Patient is a 61 year old male who presents for a RHC and PFO closure. Pmhx includes squamous cell, tonsil cancer s/p XRT and resection in 2002. Several months ago presented with neuro symptoms which after work up, concluded that he had CVA's in the left MCA. A TTE was unremarkable but a DILIA in showed a PFO. with an EF 65-70%. A loop was placed to monitor for Afib and carotid dopplers showed mild to moderate bilateral disease. Plan was made to have PFO closure.       MEDICATIONS:    sodium chloride 0.9%. 1000 milliLiter(s) IV Continuous <Continuous      TELEMETRY: NSR    T(C): 36.6 (07-07-17 @ 10:35), Max: 36.6 (07-07-17 @ 10:35)  HR: 50 (07-07-17 @ 13:30) (44 - 50)  BP: 130/70 (07-07-17 @ 13:30) (127/72 - 149/81)  RR: 16 (07-07-17 @ 13:30) (16 - 18)  SpO2: 99% (07-07-17 @ 13:30) (96% - 100%)  Wt(kg): --    PHYSICAL EXAM:  Appearance: Normal	  HEENT:   Normal oral mucosa, PERRL  Cardiovascular: Normal S1 S2, No JVD, No murmurs, No edema  Respiratory: Lungs clear to auscultation	  Psychiatry: A & O x 3, Mood & affect appropriate  Gastrointestinal:  Soft, Non-tender, + BS	  Skin: No rashes, No ecchymoses, No cyanosis  Neurologic: Non-focal, A&O X3.  No neuro deficits  Extremities: Normal range of motion, No clubbing, cyanosis or edema  Vascular: Peripheral pulses palpable 2+ bilaterally  Procedure Site: # 8 and #10 fr venous sheaths in the right groin d/c'd at 1415. Hemostasis was achieved with manual pressure for 15 minutes. Patient tolerated well. No bleeding, brusing, hematoma, oozing. Positive pulses.   :     PROCEDURE RESULTS: Patient s/p successful closure of PFO. No complications noted.     ASSESSMENT/PLAN: 	  -Groin precaution bed rest when sheaths in place and for 6 hours after  -Bedrest 6 hours after sheaths removed. Patient may sit up at 1915 and patient mag get oob at 2015.   -Resume home meds  -TTE this evening  -Vanco 1gm iv z1 this evening for ppx.   -Initiate patient will need asa 81mg daily and plavix 75 mg po toya to start on 7/8. Patient has both at home.   -Follow up with Cardiology in 1-2 weeks  -Check labs/EKG/site check in AM  -Probable discharge in AM if groin stable. Nurse Practitioner Progress note: s/p RHC and PFO closure.     INTERVAL HISTORY: Patient is a 61 year old male who presents for a RHC and PFO closure. Pmhx includes squamous cell, tonsil cancer s/p XRT and resection in 2002. Several months ago presented with neuro symptoms which after work up, concluded that he had CVA's in the left MCA. A TTE was unremarkable but a DILIA in showed a PFO. with an EF 65-70%. A loop was placed to monitor for Afib and carotid dopplers showed mild to moderate bilateral disease. Plan was made to have PFO closure.       MEDICATIONS:    sodium chloride 0.9%. 1000 milliLiter(s) IV Continuous <Continuous      TELEMETRY: NSR    T(C): 36.6 (07-07-17 @ 10:35), Max: 36.6 (07-07-17 @ 10:35)  HR: 50 (07-07-17 @ 13:30) (44 - 50)  BP: 130/70 (07-07-17 @ 13:30) (127/72 - 149/81)  RR: 16 (07-07-17 @ 13:30) (16 - 18)  SpO2: 99% (07-07-17 @ 13:30) (96% - 100%)  Wt(kg): --    PHYSICAL EXAM:  Appearance: Normal	  HEENT:   Normal oral mucosa, PERRL  Cardiovascular: Normal S1 S2, No JVD, No murmurs, No edema  Respiratory: Lungs clear to auscultation	  Psychiatry: A & O x 3, Mood & affect appropriate  Gastrointestinal:  Soft, Non-tender, + BS	  Skin: No rashes, No ecchymoses, No cyanosis  Neurologic: Non-focal, A&O X3.  No neuro deficits  Extremities: Normal range of motion, No clubbing, cyanosis or edema  Vascular: Peripheral pulses palpable 2+ bilaterally  Procedure Site: # 8 and #10 fr venous sheaths in the right groin d/c'd at 1415. Hemostasis was achieved with manual pressure for 15 minutes. Patient tolerated well. No bleeding, brusing, hematoma, oozing. Positive pulses.   :     PROCEDURE RESULTS: Patient s/p successful closure of PFO. No complications noted.   --Right heart catheterization.  --  Sonosite.  --  Intracardiac Echocardiogram.  --  ASD Closure.  RHC with normal right sided pressures. No  significant shunt noted.  Under fluroscopic and ICE guidance, closure performed with a 25 mm device  deployed at the atrial level. No residual shunt noted.        ASSESSMENT/PLAN: 	  -Groin precaution bed rest when sheaths in place and for 6 hours after  -Bedrest 6 hours after sheaths removed. Patient may sit up at 1915 and patient mag get oob at 2015.   -Resume home meds  -TTE this evening  -Vanco 1gm iv z1 this evening for ppx.   -Initiate patient will need asa 81mg daily and plavix 75 mg po toya to start on 7/8. Patient has both at home.   -Follow up with Cardiology in 1-2 weeks  -Check labs/EKG/site check in AM  -Probable discharge in AM if groin stable.

## 2017-07-07 NOTE — DISCHARGE NOTE ADULT - CARE PLAN
Principal Discharge DX:	PFO (patent foramen ovale)  Goal:	to remain free from symptoms  Instructions for follow-up, activity and diet:	Patient is s/p PFO closure. No heavy lifting, driving, sex, tub baths, swimming, or any activity that submerges the lower half of the body in water for 48 hours.  Limited walking and stairs for 48 hours.    Change the band-aid after 24 hours and every 24 hours after that.  Keep the puncture site dry and covered with a band-aid until a scab forms.    Observe the site frequently.  If bleeding or a large lump (the size of a golf ball or bigger) occurs lie flat, apply continuous direct pressure just above the puncture site for at least 10 minutes, and notify your physician immediately.  If the bleeding cannot be controlled, call 911 immediately for assistance.  Notify your physician of pain, swelling or any drainage.    Notify your physician immediately if coldness, numbness, discoloration or pain in your foot occurs.

## 2017-07-08 VITALS
RESPIRATION RATE: 16 BRPM | OXYGEN SATURATION: 98 % | HEART RATE: 70 BPM | DIASTOLIC BLOOD PRESSURE: 70 MMHG | SYSTOLIC BLOOD PRESSURE: 130 MMHG

## 2017-07-08 LAB
ALBUMIN SERPL ELPH-MCNC: 3.6 G/DL — SIGNIFICANT CHANGE UP (ref 3.3–5.2)
ALP SERPL-CCNC: 59 U/L — SIGNIFICANT CHANGE UP (ref 40–120)
ALT FLD-CCNC: 23 U/L — SIGNIFICANT CHANGE UP
ANION GAP SERPL CALC-SCNC: 10 MMOL/L — SIGNIFICANT CHANGE UP (ref 5–17)
AST SERPL-CCNC: 17 U/L — SIGNIFICANT CHANGE UP
BILIRUB SERPL-MCNC: 1.6 MG/DL — SIGNIFICANT CHANGE UP (ref 0.4–2)
BUN SERPL-MCNC: 15 MG/DL — SIGNIFICANT CHANGE UP (ref 8–20)
CALCIUM SERPL-MCNC: 8.7 MG/DL — SIGNIFICANT CHANGE UP (ref 8.6–10.2)
CHLORIDE SERPL-SCNC: 105 MMOL/L — SIGNIFICANT CHANGE UP (ref 98–107)
CO2 SERPL-SCNC: 25 MMOL/L — SIGNIFICANT CHANGE UP (ref 22–29)
CREAT SERPL-MCNC: 0.55 MG/DL — SIGNIFICANT CHANGE UP (ref 0.5–1.3)
GLUCOSE SERPL-MCNC: 179 MG/DL — HIGH (ref 70–115)
HCT VFR BLD CALC: 37.5 % — LOW (ref 42–52)
HGB BLD-MCNC: 13.4 G/DL — LOW (ref 14–18)
MCHC RBC-ENTMCNC: 31.5 PG — HIGH (ref 27–31)
MCHC RBC-ENTMCNC: 35.7 G/DL — SIGNIFICANT CHANGE UP (ref 32–36)
MCV RBC AUTO: 88 FL — SIGNIFICANT CHANGE UP (ref 80–94)
PLATELET # BLD AUTO: 127 K/UL — LOW (ref 150–400)
POTASSIUM SERPL-MCNC: 3.6 MMOL/L — SIGNIFICANT CHANGE UP (ref 3.5–5.3)
POTASSIUM SERPL-SCNC: 3.6 MMOL/L — SIGNIFICANT CHANGE UP (ref 3.5–5.3)
PROT SERPL-MCNC: 5.3 G/DL — LOW (ref 6.6–8.7)
RBC # BLD: 4.26 M/UL — LOW (ref 4.6–6.2)
RBC # FLD: 13 % — SIGNIFICANT CHANGE UP (ref 11–15.6)
SODIUM SERPL-SCNC: 140 MMOL/L — SIGNIFICANT CHANGE UP (ref 135–145)
WBC # BLD: 5.6 K/UL — SIGNIFICANT CHANGE UP (ref 4.8–10.8)
WBC # FLD AUTO: 5.6 K/UL — SIGNIFICANT CHANGE UP (ref 4.8–10.8)

## 2017-07-08 PROCEDURE — C1887: CPT

## 2017-07-08 PROCEDURE — C1759: CPT

## 2017-07-08 PROCEDURE — 85027 COMPLETE CBC AUTOMATED: CPT

## 2017-07-08 PROCEDURE — C1769: CPT

## 2017-07-08 PROCEDURE — 85610 PROTHROMBIN TIME: CPT

## 2017-07-08 PROCEDURE — 80053 COMPREHEN METABOLIC PANEL: CPT

## 2017-07-08 PROCEDURE — 93005 ELECTROCARDIOGRAM TRACING: CPT

## 2017-07-08 PROCEDURE — 86850 RBC ANTIBODY SCREEN: CPT

## 2017-07-08 PROCEDURE — 86900 BLOOD TYPING SEROLOGIC ABO: CPT

## 2017-07-08 PROCEDURE — C1889: CPT

## 2017-07-08 PROCEDURE — 86901 BLOOD TYPING SEROLOGIC RH(D): CPT

## 2017-07-08 PROCEDURE — C1894: CPT

## 2017-07-08 PROCEDURE — 36415 COLL VENOUS BLD VENIPUNCTURE: CPT

## 2017-07-08 PROCEDURE — 93306 TTE W/DOPPLER COMPLETE: CPT

## 2017-07-08 PROCEDURE — 85730 THROMBOPLASTIN TIME PARTIAL: CPT

## 2017-07-08 PROCEDURE — 93580 TRANSCATH CLOSURE OF ASD: CPT

## 2017-07-08 PROCEDURE — 93010 ELECTROCARDIOGRAM REPORT: CPT

## 2017-07-08 RX ORDER — POTASSIUM CHLORIDE 20 MEQ
40 PACKET (EA) ORAL ONCE
Qty: 0 | Refills: 0 | Status: COMPLETED | OUTPATIENT
Start: 2017-07-08 | End: 2017-07-08

## 2017-07-08 RX ADMIN — Medication 40 MILLIEQUIVALENT(S): at 10:32

## 2017-07-08 NOTE — PROGRESS NOTE ADULT - SUBJECTIVE AND OBJECTIVE BOX
S/P successful PFO closure.  See d/c note on d/c document  Post echo   No Pneumothorax noted  trace shunting noted  OK for discharge home  Supplemented potassium upon discharge.

## 2017-07-31 ENCOUNTER — APPOINTMENT (OUTPATIENT)
Dept: CARDIOLOGY | Facility: CLINIC | Age: 62
End: 2017-07-31
Payer: COMMERCIAL

## 2017-07-31 ENCOUNTER — APPOINTMENT (OUTPATIENT)
Dept: CARDIOLOGY | Facility: CLINIC | Age: 62
End: 2017-07-31

## 2017-07-31 VITALS
DIASTOLIC BLOOD PRESSURE: 78 MMHG | SYSTOLIC BLOOD PRESSURE: 122 MMHG | HEIGHT: 66.5 IN | BODY MASS INDEX: 23.19 KG/M2 | HEART RATE: 58 BPM | WEIGHT: 146 LBS | OXYGEN SATURATION: 98 %

## 2017-07-31 PROCEDURE — 93000 ELECTROCARDIOGRAM COMPLETE: CPT

## 2017-07-31 PROCEDURE — 99214 OFFICE O/P EST MOD 30 MIN: CPT

## 2017-08-04 ENCOUNTER — APPOINTMENT (OUTPATIENT)
Dept: ELECTROPHYSIOLOGY | Facility: CLINIC | Age: 62
End: 2017-08-04
Payer: COMMERCIAL

## 2017-08-04 PROCEDURE — 93298 REM INTERROG DEV EVAL SCRMS: CPT

## 2017-08-04 PROCEDURE — 93299: CPT

## 2017-08-08 ENCOUNTER — APPOINTMENT (OUTPATIENT)
Dept: CARDIOLOGY | Facility: CLINIC | Age: 62
End: 2017-08-08

## 2017-08-11 ENCOUNTER — APPOINTMENT (OUTPATIENT)
Dept: ELECTROPHYSIOLOGY | Facility: CLINIC | Age: 62
End: 2017-08-11

## 2017-08-14 ENCOUNTER — EMERGENCY (EMERGENCY)
Facility: HOSPITAL | Age: 62
LOS: 1 days | Discharge: HOME | End: 2017-08-14
Attending: EMERGENCY MEDICINE | Admitting: EMERGENCY MEDICINE
Payer: COMMERCIAL

## 2017-08-14 VITALS — WEIGHT: 145.06 LBS | HEIGHT: 66 IN

## 2017-08-14 VITALS
RESPIRATION RATE: 20 BRPM | TEMPERATURE: 98 F | SYSTOLIC BLOOD PRESSURE: 142 MMHG | OXYGEN SATURATION: 99 % | DIASTOLIC BLOOD PRESSURE: 76 MMHG | HEART RATE: 50 BPM

## 2017-08-14 DIAGNOSIS — Z98.890 OTHER SPECIFIED POSTPROCEDURAL STATES: Chronic | ICD-10-CM

## 2017-08-14 LAB
ALBUMIN SERPL ELPH-MCNC: 4.1 G/DL — SIGNIFICANT CHANGE UP (ref 3.3–5.2)
ALP SERPL-CCNC: 75 U/L — SIGNIFICANT CHANGE UP (ref 40–120)
ALT FLD-CCNC: 35 U/L — SIGNIFICANT CHANGE UP
ANION GAP SERPL CALC-SCNC: 10 MMOL/L — SIGNIFICANT CHANGE UP (ref 5–17)
APTT BLD: 34 SEC — SIGNIFICANT CHANGE UP (ref 27.5–37.4)
AST SERPL-CCNC: 22 U/L — SIGNIFICANT CHANGE UP
BASOPHILS # BLD AUTO: 0 K/UL — SIGNIFICANT CHANGE UP (ref 0–0.2)
BASOPHILS NFR BLD AUTO: 0.6 % — SIGNIFICANT CHANGE UP (ref 0–2)
BILIRUB SERPL-MCNC: 1.1 MG/DL — SIGNIFICANT CHANGE UP (ref 0.4–2)
BUN SERPL-MCNC: 17 MG/DL — SIGNIFICANT CHANGE UP (ref 8–20)
CALCIUM SERPL-MCNC: 9.1 MG/DL — SIGNIFICANT CHANGE UP (ref 8.6–10.2)
CHLORIDE SERPL-SCNC: 102 MMOL/L — SIGNIFICANT CHANGE UP (ref 98–107)
CO2 SERPL-SCNC: 27 MMOL/L — SIGNIFICANT CHANGE UP (ref 22–29)
CREAT SERPL-MCNC: 0.61 MG/DL — SIGNIFICANT CHANGE UP (ref 0.5–1.3)
EOSINOPHIL # BLD AUTO: 0.1 K/UL — SIGNIFICANT CHANGE UP (ref 0–0.5)
EOSINOPHIL NFR BLD AUTO: 3 % — SIGNIFICANT CHANGE UP (ref 0–5)
GLUCOSE SERPL-MCNC: 153 MG/DL — HIGH (ref 70–115)
HCT VFR BLD CALC: 38.9 % — LOW (ref 42–52)
HGB BLD-MCNC: 13.2 G/DL — LOW (ref 14–18)
INR BLD: 1.06 RATIO — SIGNIFICANT CHANGE UP (ref 0.88–1.16)
LYMPHOCYTES # BLD AUTO: 0.9 K/UL — LOW (ref 1–4.8)
LYMPHOCYTES # BLD AUTO: 24.5 % — SIGNIFICANT CHANGE UP (ref 20–55)
MCHC RBC-ENTMCNC: 31.1 PG — HIGH (ref 27–31)
MCHC RBC-ENTMCNC: 33.9 G/DL — SIGNIFICANT CHANGE UP (ref 32–36)
MCV RBC AUTO: 91.5 FL — SIGNIFICANT CHANGE UP (ref 80–94)
MONOCYTES # BLD AUTO: 0.3 K/UL — SIGNIFICANT CHANGE UP (ref 0–0.8)
MONOCYTES NFR BLD AUTO: 8.8 % — SIGNIFICANT CHANGE UP (ref 3–10)
NEUTROPHILS # BLD AUTO: 2.3 K/UL — SIGNIFICANT CHANGE UP (ref 1.8–8)
NEUTROPHILS NFR BLD AUTO: 63.1 % — SIGNIFICANT CHANGE UP (ref 37–73)
PLATELET # BLD AUTO: 156 K/UL — SIGNIFICANT CHANGE UP (ref 150–400)
POTASSIUM SERPL-MCNC: 4.2 MMOL/L — SIGNIFICANT CHANGE UP (ref 3.5–5.3)
POTASSIUM SERPL-SCNC: 4.2 MMOL/L — SIGNIFICANT CHANGE UP (ref 3.5–5.3)
PROT SERPL-MCNC: 6 G/DL — LOW (ref 6.6–8.7)
PROTHROM AB SERPL-ACNC: 11.7 SEC — SIGNIFICANT CHANGE UP (ref 9.8–12.7)
RBC # BLD: 4.25 M/UL — LOW (ref 4.6–6.2)
RBC # FLD: 13.2 % — SIGNIFICANT CHANGE UP (ref 11–15.6)
SODIUM SERPL-SCNC: 139 MMOL/L — SIGNIFICANT CHANGE UP (ref 135–145)
TROPONIN T SERPL-MCNC: <0.01 NG/ML — SIGNIFICANT CHANGE UP (ref 0–0.06)
WBC # BLD: 3.6 K/UL — LOW (ref 4.8–10.8)
WBC # FLD AUTO: 3.6 K/UL — LOW (ref 4.8–10.8)

## 2017-08-14 PROCEDURE — 80053 COMPREHEN METABOLIC PANEL: CPT

## 2017-08-14 PROCEDURE — 85610 PROTHROMBIN TIME: CPT

## 2017-08-14 PROCEDURE — 70450 CT HEAD/BRAIN W/O DYE: CPT | Mod: 26

## 2017-08-14 PROCEDURE — 99284 EMERGENCY DEPT VISIT MOD MDM: CPT | Mod: 25

## 2017-08-14 PROCEDURE — 36415 COLL VENOUS BLD VENIPUNCTURE: CPT

## 2017-08-14 PROCEDURE — 85027 COMPLETE CBC AUTOMATED: CPT

## 2017-08-14 PROCEDURE — 71046 X-RAY EXAM CHEST 2 VIEWS: CPT

## 2017-08-14 PROCEDURE — 71020: CPT | Mod: 26

## 2017-08-14 PROCEDURE — 70450 CT HEAD/BRAIN W/O DYE: CPT

## 2017-08-14 PROCEDURE — 99285 EMERGENCY DEPT VISIT HI MDM: CPT

## 2017-08-14 PROCEDURE — 84484 ASSAY OF TROPONIN QUANT: CPT

## 2017-08-14 PROCEDURE — 85730 THROMBOPLASTIN TIME PARTIAL: CPT

## 2017-08-14 PROCEDURE — 93005 ELECTROCARDIOGRAM TRACING: CPT

## 2017-08-14 PROCEDURE — 93010 ELECTROCARDIOGRAM REPORT: CPT

## 2017-08-14 NOTE — ED PROVIDER NOTE - PROGRESS NOTE DETAILS
pt's symptoms have resolved and he had a negative ct scan and will be dischargwed. he has a appointment with his cardiologist for a stress test in 2 days

## 2017-08-14 NOTE — ED ADULT NURSE NOTE - NS ED NURSE DC INFO COMPLEXITY
Straightforward: Basic instructions, no meds, no home treatment/Patient asked questions/Verbalized Understanding/Returned Demonstration

## 2017-08-14 NOTE — ED ADULT NURSE NOTE - OBJECTIVE STATEMENT
Patient A&OX3. c/o SOB and  numbness in right hand. hx stroke. Patient sent by cardio. CM in place. cesar on monitor. Dr Curry at bedside. VSS. safety maintained.

## 2017-08-14 NOTE — ED ADULT NURSE REASSESSMENT NOTE - NS ED NURSE REASSESS COMMENT FT1
Patient A&OX3. Denies any pain or discomfort at this time. CM in place. cesar on monitor. Dr Curry at bedside. VSS. safety maintained.

## 2017-08-14 NOTE — ED PROVIDER NOTE - OBJECTIVE STATEMENT
62 year old male with a h/o head and neck cancer and CVA  in the past presents with c/o dizziness and sob. pt was in his us[facility] until  yesterday when he developed some numbness and tingling to the right arm  pt also c/o sob without chest pain

## 2017-08-16 ENCOUNTER — APPOINTMENT (OUTPATIENT)
Dept: CARDIOLOGY | Facility: CLINIC | Age: 62
End: 2017-08-16
Payer: COMMERCIAL

## 2017-08-16 PROCEDURE — 93015 CV STRESS TEST SUPVJ I&R: CPT

## 2017-08-16 RX ORDER — CHOLECALCIFEROL (VITAMIN D3) 125 MCG
1 CAPSULE ORAL
Qty: 0 | Refills: 0 | COMMUNITY

## 2017-08-16 RX ORDER — LANOLIN ALCOHOL/MO/W.PET/CERES
1 CREAM (GRAM) TOPICAL
Qty: 0 | Refills: 0 | COMMUNITY

## 2017-08-16 RX ORDER — ASPIRIN/CALCIUM CARB/MAGNESIUM 324 MG
1 TABLET ORAL
Qty: 0 | Refills: 0 | COMMUNITY

## 2017-08-16 RX ORDER — ATORVASTATIN CALCIUM 80 MG/1
1 TABLET, FILM COATED ORAL
Qty: 0 | Refills: 0 | COMMUNITY

## 2017-08-16 RX ORDER — CLOPIDOGREL BISULFATE 75 MG/1
1 TABLET, FILM COATED ORAL
Qty: 0 | Refills: 0 | COMMUNITY

## 2017-08-16 RX ORDER — CLONAZEPAM 1 MG
0 TABLET ORAL
Qty: 0 | Refills: 0 | COMMUNITY

## 2017-08-21 ENCOUNTER — APPOINTMENT (OUTPATIENT)
Dept: CARDIOLOGY | Facility: CLINIC | Age: 62
End: 2017-08-21
Payer: COMMERCIAL

## 2017-08-21 VITALS
DIASTOLIC BLOOD PRESSURE: 66 MMHG | HEART RATE: 42 BPM | OXYGEN SATURATION: 100 % | BODY MASS INDEX: 23.3 KG/M2 | HEIGHT: 66 IN | WEIGHT: 145 LBS | SYSTOLIC BLOOD PRESSURE: 125 MMHG

## 2017-08-21 PROCEDURE — 93000 ELECTROCARDIOGRAM COMPLETE: CPT

## 2017-08-21 PROCEDURE — 99215 OFFICE O/P EST HI 40 MIN: CPT

## 2017-08-21 RX ORDER — ASPIRIN 325 MG/1
325 TABLET, FILM COATED ORAL DAILY
Qty: 90 | Refills: 1 | Status: DISCONTINUED | COMMUNITY
End: 2017-08-21

## 2017-08-30 ENCOUNTER — NON-APPOINTMENT (OUTPATIENT)
Age: 62
End: 2017-08-30

## 2017-09-05 ENCOUNTER — APPOINTMENT (OUTPATIENT)
Dept: ELECTROPHYSIOLOGY | Facility: CLINIC | Age: 62
End: 2017-09-05
Payer: COMMERCIAL

## 2017-09-05 PROCEDURE — 93299: CPT

## 2017-09-05 PROCEDURE — 93298 REM INTERROG DEV EVAL SCRMS: CPT

## 2017-09-11 ENCOUNTER — TRANSCRIPTION ENCOUNTER (OUTPATIENT)
Age: 62
End: 2017-09-11

## 2017-09-19 DIAGNOSIS — M79.604 PAIN IN RIGHT LEG: ICD-10-CM

## 2017-09-26 ENCOUNTER — APPOINTMENT (OUTPATIENT)
Dept: CARDIOLOGY | Facility: CLINIC | Age: 62
End: 2017-09-26
Payer: COMMERCIAL

## 2017-09-26 VITALS
OXYGEN SATURATION: 96 % | BODY MASS INDEX: 23.3 KG/M2 | SYSTOLIC BLOOD PRESSURE: 108 MMHG | WEIGHT: 145 LBS | DIASTOLIC BLOOD PRESSURE: 64 MMHG | HEIGHT: 66 IN | HEART RATE: 61 BPM

## 2017-09-26 PROCEDURE — 93000 ELECTROCARDIOGRAM COMPLETE: CPT

## 2017-09-26 PROCEDURE — 99214 OFFICE O/P EST MOD 30 MIN: CPT

## 2017-09-26 PROCEDURE — 93970 EXTREMITY STUDY: CPT

## 2017-10-06 ENCOUNTER — APPOINTMENT (OUTPATIENT)
Dept: ELECTROPHYSIOLOGY | Facility: CLINIC | Age: 62
End: 2017-10-06
Payer: COMMERCIAL

## 2017-10-06 PROCEDURE — 93299: CPT

## 2017-10-06 PROCEDURE — 93298 REM INTERROG DEV EVAL SCRMS: CPT

## 2017-10-12 ENCOUNTER — NON-APPOINTMENT (OUTPATIENT)
Age: 62
End: 2017-10-12

## 2017-10-12 ENCOUNTER — APPOINTMENT (OUTPATIENT)
Dept: ELECTROPHYSIOLOGY | Facility: CLINIC | Age: 62
End: 2017-10-12
Payer: COMMERCIAL

## 2017-10-12 VITALS
DIASTOLIC BLOOD PRESSURE: 76 MMHG | SYSTOLIC BLOOD PRESSURE: 112 MMHG | HEART RATE: 51 BPM | BODY MASS INDEX: 22.98 KG/M2 | OXYGEN SATURATION: 100 % | HEIGHT: 66 IN | WEIGHT: 143 LBS

## 2017-10-12 PROCEDURE — 99244 OFF/OP CNSLTJ NEW/EST MOD 40: CPT

## 2017-10-12 PROCEDURE — 93000 ELECTROCARDIOGRAM COMPLETE: CPT

## 2017-10-21 ENCOUNTER — NON-APPOINTMENT (OUTPATIENT)
Age: 62
End: 2017-10-21

## 2017-11-06 ENCOUNTER — APPOINTMENT (OUTPATIENT)
Dept: ELECTROPHYSIOLOGY | Facility: CLINIC | Age: 62
End: 2017-11-06
Payer: COMMERCIAL

## 2017-11-06 PROCEDURE — 93298 REM INTERROG DEV EVAL SCRMS: CPT

## 2017-11-06 PROCEDURE — 93299: CPT

## 2017-11-13 ENCOUNTER — RX RENEWAL (OUTPATIENT)
Age: 62
End: 2017-11-13

## 2017-11-16 ENCOUNTER — OTHER (OUTPATIENT)
Age: 62
End: 2017-11-16

## 2017-11-21 ENCOUNTER — APPOINTMENT (OUTPATIENT)
Dept: CARDIOLOGY | Facility: CLINIC | Age: 62
End: 2017-11-21
Payer: COMMERCIAL

## 2017-11-21 ENCOUNTER — NON-APPOINTMENT (OUTPATIENT)
Age: 62
End: 2017-11-21

## 2017-11-21 VITALS
HEIGHT: 66 IN | DIASTOLIC BLOOD PRESSURE: 72 MMHG | BODY MASS INDEX: 23.3 KG/M2 | WEIGHT: 145 LBS | OXYGEN SATURATION: 97 % | HEART RATE: 53 BPM | SYSTOLIC BLOOD PRESSURE: 120 MMHG

## 2017-11-21 PROCEDURE — 93000 ELECTROCARDIOGRAM COMPLETE: CPT

## 2017-11-21 PROCEDURE — 99214 OFFICE O/P EST MOD 30 MIN: CPT

## 2017-11-21 RX ORDER — ATORVASTATIN CALCIUM 20 MG/1
20 TABLET, FILM COATED ORAL
Qty: 90 | Refills: 3 | Status: DISCONTINUED | COMMUNITY
End: 2017-11-21

## 2017-12-08 ENCOUNTER — APPOINTMENT (OUTPATIENT)
Dept: ELECTROPHYSIOLOGY | Facility: CLINIC | Age: 62
End: 2017-12-08
Payer: COMMERCIAL

## 2017-12-08 PROCEDURE — 93298 REM INTERROG DEV EVAL SCRMS: CPT

## 2017-12-08 PROCEDURE — 93299: CPT

## 2018-01-05 ENCOUNTER — OTHER (OUTPATIENT)
Age: 63
End: 2018-01-05

## 2018-01-08 ENCOUNTER — APPOINTMENT (OUTPATIENT)
Dept: ELECTROPHYSIOLOGY | Facility: CLINIC | Age: 63
End: 2018-01-08
Payer: COMMERCIAL

## 2018-01-08 PROCEDURE — 93298 REM INTERROG DEV EVAL SCRMS: CPT

## 2018-01-08 PROCEDURE — 93299: CPT

## 2018-01-09 ENCOUNTER — APPOINTMENT (OUTPATIENT)
Dept: CARDIOLOGY | Facility: CLINIC | Age: 63
End: 2018-01-09
Payer: COMMERCIAL

## 2018-01-09 PROCEDURE — 93880 EXTRACRANIAL BILAT STUDY: CPT

## 2018-01-09 PROCEDURE — 93306 TTE W/DOPPLER COMPLETE: CPT

## 2018-01-18 ENCOUNTER — APPOINTMENT (OUTPATIENT)
Dept: ELECTROPHYSIOLOGY | Facility: CLINIC | Age: 63
End: 2018-01-18

## 2018-02-09 ENCOUNTER — APPOINTMENT (OUTPATIENT)
Dept: ELECTROPHYSIOLOGY | Facility: CLINIC | Age: 63
End: 2018-02-09
Payer: COMMERCIAL

## 2018-02-09 PROCEDURE — 93299: CPT

## 2018-02-09 PROCEDURE — 93298 REM INTERROG DEV EVAL SCRMS: CPT

## 2018-02-28 PROBLEM — N40.0 BENIGN PROSTATIC HYPERPLASIA WITHOUT LOWER URINARY TRACT SYMPTOMS: Chronic | Status: ACTIVE | Noted: 2017-01-11

## 2018-02-28 PROBLEM — C13.9 MALIGNANT NEOPLASM OF HYPOPHARYNX, UNSPECIFIED: Chronic | Status: ACTIVE | Noted: 2017-01-11

## 2018-03-12 ENCOUNTER — APPOINTMENT (OUTPATIENT)
Dept: ELECTROPHYSIOLOGY | Facility: CLINIC | Age: 63
End: 2018-03-12
Payer: COMMERCIAL

## 2018-03-12 PROCEDURE — 93298 REM INTERROG DEV EVAL SCRMS: CPT

## 2018-03-12 PROCEDURE — 93299: CPT

## 2018-04-13 ENCOUNTER — APPOINTMENT (OUTPATIENT)
Dept: ELECTROPHYSIOLOGY | Facility: CLINIC | Age: 63
End: 2018-04-13
Payer: COMMERCIAL

## 2018-04-13 PROCEDURE — 93298 REM INTERROG DEV EVAL SCRMS: CPT

## 2018-04-13 PROCEDURE — 93299: CPT

## 2018-05-01 ENCOUNTER — APPOINTMENT (OUTPATIENT)
Dept: CARDIOLOGY | Facility: CLINIC | Age: 63
End: 2018-05-01
Payer: COMMERCIAL

## 2018-05-01 VITALS
DIASTOLIC BLOOD PRESSURE: 70 MMHG | OXYGEN SATURATION: 98 % | SYSTOLIC BLOOD PRESSURE: 113 MMHG | HEART RATE: 50 BPM | HEIGHT: 66 IN | WEIGHT: 145 LBS | BODY MASS INDEX: 23.3 KG/M2

## 2018-05-01 PROCEDURE — 99214 OFFICE O/P EST MOD 30 MIN: CPT

## 2018-05-01 RX ORDER — CLOPIDOGREL 75 MG/1
75 TABLET, FILM COATED ORAL DAILY
Qty: 1 | Refills: 2 | Status: DISCONTINUED | COMMUNITY
End: 2018-05-01

## 2018-05-14 ENCOUNTER — APPOINTMENT (OUTPATIENT)
Dept: ELECTROPHYSIOLOGY | Facility: CLINIC | Age: 63
End: 2018-05-14
Payer: COMMERCIAL

## 2018-05-14 PROCEDURE — 93298 REM INTERROG DEV EVAL SCRMS: CPT

## 2018-05-14 PROCEDURE — 93299: CPT

## 2018-05-21 ENCOUNTER — APPOINTMENT (OUTPATIENT)
Dept: CARDIOLOGY | Facility: CLINIC | Age: 63
End: 2018-05-21

## 2018-06-15 ENCOUNTER — APPOINTMENT (OUTPATIENT)
Dept: ELECTROPHYSIOLOGY | Facility: CLINIC | Age: 63
End: 2018-06-15
Payer: COMMERCIAL

## 2018-06-15 PROCEDURE — 93299: CPT

## 2018-06-15 PROCEDURE — 93298 REM INTERROG DEV EVAL SCRMS: CPT

## 2018-07-10 ENCOUNTER — APPOINTMENT (OUTPATIENT)
Dept: CARDIOLOGY | Facility: CLINIC | Age: 63
End: 2018-07-10
Payer: COMMERCIAL

## 2018-07-10 ENCOUNTER — NON-APPOINTMENT (OUTPATIENT)
Age: 63
End: 2018-07-10

## 2018-07-10 VITALS
WEIGHT: 147 LBS | DIASTOLIC BLOOD PRESSURE: 75 MMHG | HEART RATE: 50 BPM | OXYGEN SATURATION: 99 % | SYSTOLIC BLOOD PRESSURE: 116 MMHG | BODY MASS INDEX: 23.73 KG/M2

## 2018-07-10 DIAGNOSIS — Q21.1 ATRIAL SEPTAL DEFECT: ICD-10-CM

## 2018-07-10 PROCEDURE — 93000 ELECTROCARDIOGRAM COMPLETE: CPT

## 2018-07-10 PROCEDURE — 99214 OFFICE O/P EST MOD 30 MIN: CPT

## 2018-07-16 ENCOUNTER — APPOINTMENT (OUTPATIENT)
Dept: ELECTROPHYSIOLOGY | Facility: CLINIC | Age: 63
End: 2018-07-16
Payer: COMMERCIAL

## 2018-07-16 PROCEDURE — 93299: CPT

## 2018-07-16 PROCEDURE — 93298 REM INTERROG DEV EVAL SCRMS: CPT

## 2018-07-26 PROBLEM — Z86.73 HISTORY OF STROKE: Status: RESOLVED | Noted: 2017-01-24 | Resolved: 2018-07-26

## 2018-08-17 ENCOUNTER — APPOINTMENT (OUTPATIENT)
Dept: ELECTROPHYSIOLOGY | Facility: CLINIC | Age: 63
End: 2018-08-17
Payer: COMMERCIAL

## 2018-08-17 PROCEDURE — 93299: CPT

## 2018-08-17 PROCEDURE — 93298 REM INTERROG DEV EVAL SCRMS: CPT

## 2018-09-17 ENCOUNTER — APPOINTMENT (OUTPATIENT)
Dept: ELECTROPHYSIOLOGY | Facility: CLINIC | Age: 63
End: 2018-09-17
Payer: COMMERCIAL

## 2018-09-17 PROCEDURE — 93299: CPT

## 2018-09-17 PROCEDURE — 93298 REM INTERROG DEV EVAL SCRMS: CPT

## 2018-10-17 NOTE — ED ADULT NURSE REASSESSMENT NOTE - NS ED NURSE REASSESS COMMENT FT1
[de-identified] : presents for f/u visit, wanted to address feeling mildly fatigued when he is working out over past few weeks. still able to exercise regularly, does a lot of weight lifting and cardio but has less energy. no weight loss, normal bowel movements. no depression or ED symptoms. not taking any rx currently. \par hyperlipidemia controlled on rx \par he also has recurrent mild pain over lateral epicondyle region R arm. worse when doing bicep curls which he does regularly. no injury or swelling of elbow. has not tried NSAIDs as yet  pt received from RN at [id number]. pt resting comfortably on stretcher

## 2018-10-19 ENCOUNTER — APPOINTMENT (OUTPATIENT)
Dept: ELECTROPHYSIOLOGY | Facility: CLINIC | Age: 63
End: 2018-10-19
Payer: COMMERCIAL

## 2018-10-19 PROCEDURE — 93298 REM INTERROG DEV EVAL SCRMS: CPT

## 2018-10-19 PROCEDURE — 93299: CPT

## 2018-11-12 ENCOUNTER — MEDICATION RENEWAL (OUTPATIENT)
Age: 63
End: 2018-11-12

## 2018-11-19 ENCOUNTER — APPOINTMENT (OUTPATIENT)
Dept: ELECTROPHYSIOLOGY | Facility: CLINIC | Age: 63
End: 2018-11-19

## 2018-12-21 ENCOUNTER — APPOINTMENT (OUTPATIENT)
Dept: ELECTROPHYSIOLOGY | Facility: CLINIC | Age: 63
End: 2018-12-21

## 2019-01-10 ENCOUNTER — APPOINTMENT (OUTPATIENT)
Dept: CARDIOLOGY | Facility: CLINIC | Age: 64
End: 2019-01-10
Payer: COMMERCIAL

## 2019-01-10 ENCOUNTER — NON-APPOINTMENT (OUTPATIENT)
Age: 64
End: 2019-01-10

## 2019-01-10 VITALS
HEART RATE: 51 BPM | SYSTOLIC BLOOD PRESSURE: 129 MMHG | BODY MASS INDEX: 22.82 KG/M2 | OXYGEN SATURATION: 95 % | DIASTOLIC BLOOD PRESSURE: 77 MMHG | WEIGHT: 142 LBS | HEIGHT: 66 IN

## 2019-01-10 PROCEDURE — 99214 OFFICE O/P EST MOD 30 MIN: CPT

## 2019-01-10 PROCEDURE — 93000 ELECTROCARDIOGRAM COMPLETE: CPT

## 2019-01-17 ENCOUNTER — RX RENEWAL (OUTPATIENT)
Age: 64
End: 2019-01-17

## 2019-01-23 NOTE — REASON FOR VISIT
[Follow-Up - Clinic] : a clinic follow-up of [Atrial Fibrillation] : atrial fibrillation [Spouse] : spouse [FreeTextEntry1] : stroke, PFO, carotid disease

## 2019-01-23 NOTE — HISTORY OF PRESENT ILLNESS
[FreeTextEntry1] : 63 y M hx  L carotid stenosis 50%, squamous cell CA,  tonsil s/p xrt, neck dissection 2002, KELLY on CPAP, presented to ER 1/2017 , small punctate CVAs left MCA territory, PFO, mild to moderate carotid disease..  No arrhythmias noted on ILR, so he underwent PFO closure 2017.  Shortly after PFO closure, had PAF, now on xarelto.  Carotid dopplers repeated in the hospital showed mild to moderate bilateral disease.  Recurrent TIA symptoms 8/2017.  Ischemia evaluation was negative.  He developed myalgia to lipitor, and has been switched to crestor. \par \par 5/1/18 Had cystoscopy - thickening of the bladder, underwent  Bladder biopsy 4/16.  Noted hematuria "hemorrhaging" , steady, also noted "tissue", spoke with our NP in the office, aspriin and xarelto both stopped last week.  Since then no further hematuria.  PAF last week - short burst - 34 sec, asymptomatic.  Biopsy came back negative.  \par \par 7/10/18 Returns for follow up.  In the interim, restarted on xarelto, no further hematuria. Going to have a colonoscopy in August 2018.  No chest pain or shortness of breath. No palpitations, dizziness, syncope.  \par \par 1/10/19 Returns for follow up.  In the interim, had a mandibular fracture s/p surgical repair late December 2018.  Otherwise no chest pain or shortness of breath.  No palpitations, dizziness, syncope.  \par \par

## 2019-01-23 NOTE — DISCUSSION/SUMMARY
[FreeTextEntry1] : 1. CVA- multiple lesions left MCA territory; Recurrent TIA symptoms 8/2017, with PAF on LINQ. S/p PFO closure.  On xarelto.  \par 2. bilateral mild to moderate bilateral carotid stenosis.  Continue statin therapy.  LDL goal < 70. Follows with vascular surgery for routine carotid dopplers.  \par 3. PFO -s/p closure, doing well.  No leaks on last echo.  \par 4. recurrent tachyarrhythmia on LINQ - consistent with atrial fibrillation.  CHADSVASC 3 (CVA, age ).   Continue xarelto.  Considering LAVINIA closure device if recurrent bleeding issues. . \par 5. ?statin myalgia - Tolerating rosuvastatin.  LDL goal < 70 in setting of carotid disease.  Yearly lipid panel with PCP. \par 6. 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 \par \par Thank you for allowing me to participate in your patient's care.  Please do not hesitate to call if you have any questions.  \par \par \par \par \par \par

## 2019-01-23 NOTE — PHYSICAL EXAM
[General Appearance - Well Developed] : well developed [Normal Appearance] : normal appearance [Well Groomed] : well groomed [General Appearance - Well Nourished] : well nourished [No Deformities] : no deformities [General Appearance - In No Acute Distress] : no acute distress [Normal Conjunctiva] : the conjunctiva exhibited no abnormalities [Eyelids - No Xanthelasma] : the eyelids demonstrated no xanthelasmas [Normal Oral Mucosa] : normal oral mucosa [No Oral Pallor] : no oral pallor [Normal Oropharynx] : normal oropharynx [Normal Jugular Venous V Waves Present] : normal jugular venous V waves present [Respiration, Rhythm And Depth] : normal respiratory rhythm and effort [Auscultation Breath Sounds / Voice Sounds] : lungs were clear to auscultation bilaterally [Heart Rate And Rhythm] : heart rate and rhythm were normal [Heart Sounds] : normal S1 and S2 [Murmurs] : no murmurs present [Arterial Pulses Normal] : the arterial pulses were normal [Edema] : no peripheral edema present [Veins - Varicosity Changes] : no varicosital changes were noted in the lower extremities [Bowel Sounds] : normal bowel sounds [Abdomen Soft] : soft [Abdomen Tenderness] : non-tender [Abnormal Walk] : normal gait [Gait - Sufficient For Exercise Testing] : the gait was sufficient for exercise testing [Nail Clubbing] : no clubbing of the fingernails [Cyanosis, Localized] : no localized cyanosis [Skin Turgor] : normal skin turgor [] : no rash [No Venous Stasis] : no venous stasis [Skin Lesions] : no skin lesions [No Skin Ulcers] : no skin ulcer [No Xanthoma] : no  xanthoma was observed [Oriented To Time, Place, And Person] : oriented to person, place, and time [Impaired Insight] : insight and judgment were intact [Affect] : the affect was normal [Mood] : the mood was normal [Memory Recent] : recent memory was not impaired [No Anxiety] : not feeling anxious [FreeTextEntry1] : no JVD, no carotid bruits [Skin Color & Pigmentation] : normal skin color and pigmentation

## 2019-01-25 ENCOUNTER — APPOINTMENT (OUTPATIENT)
Dept: ELECTROPHYSIOLOGY | Facility: CLINIC | Age: 64
End: 2019-01-25

## 2019-03-01 ENCOUNTER — APPOINTMENT (OUTPATIENT)
Dept: ELECTROPHYSIOLOGY | Facility: CLINIC | Age: 64
End: 2019-03-01

## 2019-05-24 NOTE — DISCHARGE NOTE ADULT - NS AS DC STROKE ED MATERIALS
No Risk Factors for Stroke/Stroke Education Booklet/Call 911 for Stroke/Need for Followup After Discharge/Prescribed Medications/Stroke Warning Signs and Symptoms

## 2019-07-10 RX ORDER — PNV NO.95/FERROUS FUM/FOLIC AC 28MG-0.8MG
TABLET ORAL DAILY
Refills: 0 | Status: ACTIVE | COMMUNITY

## 2019-07-10 RX ORDER — VITAMIN K2 90 MCG
125 MCG CAPSULE ORAL
Refills: 0 | Status: ACTIVE | COMMUNITY

## 2019-07-15 ENCOUNTER — NON-APPOINTMENT (OUTPATIENT)
Age: 64
End: 2019-07-15

## 2019-07-15 ENCOUNTER — APPOINTMENT (OUTPATIENT)
Dept: CARDIOLOGY | Facility: CLINIC | Age: 64
End: 2019-07-15
Payer: MEDICARE

## 2019-07-15 VITALS
DIASTOLIC BLOOD PRESSURE: 68 MMHG | SYSTOLIC BLOOD PRESSURE: 128 MMHG | BODY MASS INDEX: 22.5 KG/M2 | OXYGEN SATURATION: 98 % | WEIGHT: 140 LBS | HEIGHT: 66 IN | HEART RATE: 49 BPM | RESPIRATION RATE: 16 BRPM

## 2019-07-15 DIAGNOSIS — I34.0 NONRHEUMATIC MITRAL (VALVE) INSUFFICIENCY: ICD-10-CM

## 2019-07-15 DIAGNOSIS — I65.23 OCCLUSION AND STENOSIS OF BILATERAL CAROTID ARTERIES: ICD-10-CM

## 2019-07-15 DIAGNOSIS — T46.6X5A MYALGIA, UNSPECIFIED SITE: ICD-10-CM

## 2019-07-15 DIAGNOSIS — M79.10 MYALGIA, UNSPECIFIED SITE: ICD-10-CM

## 2019-07-15 PROCEDURE — 99214 OFFICE O/P EST MOD 30 MIN: CPT

## 2019-07-15 PROCEDURE — 93306 TTE W/DOPPLER COMPLETE: CPT

## 2019-07-15 PROCEDURE — 93000 ELECTROCARDIOGRAM COMPLETE: CPT

## 2019-07-15 RX ORDER — PENTOXIFYLLINE 400 MG/1
400 TABLET, EXTENDED RELEASE ORAL 3 TIMES DAILY
Refills: 0 | Status: ACTIVE | COMMUNITY
Start: 2019-07-15

## 2019-07-15 NOTE — DISCUSSION/SUMMARY
[FreeTextEntry1] : 1. CVA- multiple lesions left MCA territory; Recurrent TIA symptoms 8/2017, with PAF on LINQ. S/p PFO closure.  On xarelto.  \par 2. bilateral mild to moderate bilateral carotid stenosis.  Continue statin therapy.  LDL goal < 70. Follows with vascular surgery for routine carotid dopplers.  \par 3. PFO -s/p closure, doing well.  No leaks on last echo 7/2019.  \par 4. recurrent tachyarrhythmia on LINQ - consistent with atrial fibrillation.  CHADSVASC 3 (CVA, age ).   Continue xarelto.  Considering LAVINIA closure device if recurrent bleeding issues. . He has since opted to stop monitoring with ILR.  Will explant when it's convenient for him. \par 5. ?statin myalgia - Tolerating rosuvastatin.  LDL goal < 70 in setting of carotid disease.  Yearly lipid panel with PCP. \par 6. 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  \par \par Thank you for allowing me to participate in your patient's care.  Please do not hesitate to call if you have any questions.  \par \par \par \par \par \par
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
## 2019-07-15 NOTE — PHYSICAL EXAM
[General Appearance - Well Developed] : well developed [Normal Appearance] : normal appearance [Well Groomed] : well groomed [General Appearance - Well Nourished] : well nourished [No Deformities] : no deformities [General Appearance - In No Acute Distress] : no acute distress [Normal Conjunctiva] : the conjunctiva exhibited no abnormalities [Eyelids - No Xanthelasma] : the eyelids demonstrated no xanthelasmas [Normal Oral Mucosa] : normal oral mucosa [No Oral Pallor] : no oral pallor [Normal Oropharynx] : normal oropharynx [Normal Jugular Venous V Waves Present] : normal jugular venous V waves present [Respiration, Rhythm And Depth] : normal respiratory rhythm and effort [Auscultation Breath Sounds / Voice Sounds] : lungs were clear to auscultation bilaterally [Heart Rate And Rhythm] : heart rate and rhythm were normal [Heart Sounds] : normal S1 and S2 [Murmurs] : no murmurs present [Arterial Pulses Normal] : the arterial pulses were normal [Edema] : no peripheral edema present [Veins - Varicosity Changes] : no varicosital changes were noted in the lower extremities [Bowel Sounds] : normal bowel sounds [Abdomen Soft] : soft [Abdomen Tenderness] : non-tender [Abnormal Walk] : normal gait [Gait - Sufficient For Exercise Testing] : the gait was sufficient for exercise testing [Nail Clubbing] : no clubbing of the fingernails [Cyanosis, Localized] : no localized cyanosis [Skin Color & Pigmentation] : normal skin color and pigmentation [Skin Turgor] : normal skin turgor [] : no rash [No Venous Stasis] : no venous stasis [Skin Lesions] : no skin lesions [No Skin Ulcers] : no skin ulcer [No Xanthoma] : no  xanthoma was observed [Oriented To Time, Place, And Person] : oriented to person, place, and time [Impaired Insight] : insight and judgment were intact [Affect] : the affect was normal [Mood] : the mood was normal [Memory Recent] : recent memory was not impaired [No Anxiety] : not feeling anxious [FreeTextEntry1] : no JVD, no carotid bruits

## 2019-10-21 ENCOUNTER — RX RENEWAL (OUTPATIENT)
Age: 64
End: 2019-10-21

## 2020-01-01 NOTE — ED ADULT NURSE NOTE - HARM RISK FACTORS
Orientation to room/Bed in low position, brakes on/Side rails x 2 or 4 up, assess large gaps, such that a patient could get extremity or other body part entrapped, use additional safety procedures/Call light is within reach, educate patient/family on its functionality/Environment clear of unused equipment, furniture's in place, clear of hazards/Patient and family education available to parents and patient/Document fall prevention teaching and include in plan of care/Identify patient with a "humpty dumpty sticker" on the patient, in the bed and in patient chart/Educate patient/parents of falls protocol precautions/Developmentally place patient in appropriate bed/Evaluate medication administration times/Remove all unused equipment out of the room/Keep bed in the lowest position, unless patient is directly attended
no

## 2020-02-25 ENCOUNTER — NON-APPOINTMENT (OUTPATIENT)
Age: 65
End: 2020-02-25

## 2020-02-25 ENCOUNTER — APPOINTMENT (OUTPATIENT)
Dept: CARDIOLOGY | Facility: CLINIC | Age: 65
End: 2020-02-25
Payer: MEDICARE

## 2020-02-25 VITALS
OXYGEN SATURATION: 98 % | DIASTOLIC BLOOD PRESSURE: 62 MMHG | HEART RATE: 47 BPM | WEIGHT: 147 LBS | SYSTOLIC BLOOD PRESSURE: 110 MMHG | BODY MASS INDEX: 23.63 KG/M2 | HEIGHT: 66 IN

## 2020-02-25 DIAGNOSIS — Z86.39 PERSONAL HISTORY OF OTHER ENDOCRINE, NUTRITIONAL AND METABOLIC DISEASE: ICD-10-CM

## 2020-02-25 PROCEDURE — 99214 OFFICE O/P EST MOD 30 MIN: CPT

## 2020-02-25 PROCEDURE — 93000 ELECTROCARDIOGRAM COMPLETE: CPT

## 2020-03-19 NOTE — DISCUSSION/SUMMARY
[FreeTextEntry1] : 1. CVA- multiple lesions left MCA territory; Recurrent TIA symptoms 8/2017, with PAF on LINQ. S/p PFO closure.  On xarelto.  \par 2. bilateral mild to moderate bilateral carotid stenosis.  Continue statin therapy.  LDL goal < 70. Follows with vascular surgery for routine carotid dopplers.  \par 3. PFO -s/p closure, doing well.  No leaks on last echo 7/2019.  \par 4. recurrent tachyarrhythmia on LINQ - consistent with atrial fibrillation.  CHADSVASC 3 (CVA, age ).   Continue xarelto.  Considering LAVINIA closure device if recurrent bleeding issues. . He has since opted to stop monitoring with ILR.  Will explant when it's convenient for him. \par 5. ?statin myalgia - Tolerating rosuvastatin.  LDL goal < 70 in setting of carotid disease.  Yearly lipid panel with PCP. \par 6. 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  \par \par Thank you for allowing me to participate in your patient's care.  Please do not hesitate to call if you have any questions.  \par \par recheck echo later this year or early 2021.\par \par \par \par \par

## 2020-03-19 NOTE — HISTORY OF PRESENT ILLNESS
[FreeTextEntry1] : 64 y M hx  L carotid stenosis 50%, squamous cell CA,  tonsil s/p xrt, neck dissection 2002, KELLY on CPAP, presented to ER 1/2017 , small punctate CVAs left MCA territory, PFO, mild to moderate carotid disease..  No arrhythmias noted on ILR, so he underwent PFO closure 2017.  Shortly after PFO closure, had PAF, now on xarelto.  Carotid dopplers repeated in the hospital showed mild to moderate bilateral disease.  Recurrent TIA symptoms 8/2017.  Ischemia evaluation was negative.  He developed myalgia to lipitor, and has been switched to crestor. \par \par 5/1/18 Had cystoscopy - thickening of the bladder, underwent  Bladder biopsy 4/16.  Noted hematuria "hemorrhaging" , steady, also noted "tissue", spoke with our NP in the office, aspriin and xarelto both stopped last week.  Since then no further hematuria.  PAF last week - short burst - 34 sec, asymptomatic.  Biopsy came back negative.  \par \par 7/10/18 Returns for follow up.  In the interim, restarted on xarelto, no further hematuria. Going to have a colonoscopy in August 2018.  No chest pain or shortness of breath. No palpitations, dizziness, syncope.  \par \par 1/10/19 Returns for follow up.  In the interim, had a mandibular fracture s/p surgical repair late December 2018.  Otherwise no chest pain or shortness of breath.  No palpitations, dizziness, syncope.  \par \par 7/15/19 Returns for follow up.  In the interim, has been feeling well.  No chest pain or shortness of breath.  Started on pentoxifylline 1 month ago.  Has numbness left side of chin and lips.  No palpitations. Echo today showed normal biventricular systolic function.  No leaks around PFO amplatzer device. MR is unchanged. Tripped and fell in April - had hairline fractures of the hip, did not need interventions.  No further hematuria. Has opted to not continue ILR monitoring.  Will think of explant if ever he's in the hospital.  \par \par 2/25/20 Returns for follow up.  In the interim, no falls.  Now retired.  Running 3x a week - 3 miles each time 6 miles per hr.  No complaints.  \par \par \par \par

## 2020-03-19 NOTE — PHYSICAL EXAM
[General Appearance - Well Developed] : well developed [Normal Appearance] : normal appearance [General Appearance - Well Nourished] : well nourished [Well Groomed] : well groomed [General Appearance - In No Acute Distress] : no acute distress [No Deformities] : no deformities [Normal Conjunctiva] : the conjunctiva exhibited no abnormalities [Eyelids - No Xanthelasma] : the eyelids demonstrated no xanthelasmas [Normal Oral Mucosa] : normal oral mucosa [No Oral Pallor] : no oral pallor [Normal Oropharynx] : normal oropharynx [Normal Jugular Venous V Waves Present] : normal jugular venous V waves present [Respiration, Rhythm And Depth] : normal respiratory rhythm and effort [Heart Rate And Rhythm] : heart rate and rhythm were normal [Auscultation Breath Sounds / Voice Sounds] : lungs were clear to auscultation bilaterally [Heart Sounds] : normal S1 and S2 [Murmurs] : no murmurs present [Arterial Pulses Normal] : the arterial pulses were normal [Veins - Varicosity Changes] : no varicosital changes were noted in the lower extremities [Edema] : no peripheral edema present [Bowel Sounds] : normal bowel sounds [Abdomen Tenderness] : non-tender [Abdomen Soft] : soft [Abnormal Walk] : normal gait [Gait - Sufficient For Exercise Testing] : the gait was sufficient for exercise testing [Nail Clubbing] : no clubbing of the fingernails [Cyanosis, Localized] : no localized cyanosis [Skin Color & Pigmentation] : normal skin color and pigmentation [Skin Turgor] : normal skin turgor [] : no rash [No Venous Stasis] : no venous stasis [Skin Lesions] : no skin lesions [No Skin Ulcers] : no skin ulcer [No Xanthoma] : no  xanthoma was observed [Oriented To Time, Place, And Person] : oriented to person, place, and time [Mood] : the mood was normal [Affect] : the affect was normal [Impaired Insight] : insight and judgment were intact [Memory Recent] : recent memory was not impaired [No Anxiety] : not feeling anxious [FreeTextEntry1] : no JVD, no carotid bruits

## 2020-08-25 ENCOUNTER — APPOINTMENT (OUTPATIENT)
Dept: CARDIOLOGY | Facility: CLINIC | Age: 65
End: 2020-08-25
Payer: MEDICARE

## 2020-08-25 ENCOUNTER — NON-APPOINTMENT (OUTPATIENT)
Age: 65
End: 2020-08-25

## 2020-08-25 VITALS
TEMPERATURE: 98.4 F | HEART RATE: 54 BPM | DIASTOLIC BLOOD PRESSURE: 66 MMHG | WEIGHT: 140 LBS | HEIGHT: 66 IN | SYSTOLIC BLOOD PRESSURE: 107 MMHG | OXYGEN SATURATION: 98 % | BODY MASS INDEX: 22.5 KG/M2

## 2020-08-25 DIAGNOSIS — I77.9 DISORDER OF ARTERIES AND ARTERIOLES, UNSPECIFIED: ICD-10-CM

## 2020-08-25 DIAGNOSIS — I63.9 CEREBRAL INFARCTION, UNSPECIFIED: ICD-10-CM

## 2020-08-25 DIAGNOSIS — T46.6X5A DRUG-INDUCED MYOPATHY: ICD-10-CM

## 2020-08-25 DIAGNOSIS — Z87.74 PERSONAL HISTORY OF (CORRECTED) CONGENITAL MALFORMATIONS OF HEART AND CIRCULATORY SYSTEM: ICD-10-CM

## 2020-08-25 DIAGNOSIS — I48.0 PAROXYSMAL ATRIAL FIBRILLATION: ICD-10-CM

## 2020-08-25 DIAGNOSIS — Z95.818 PRESENCE OF OTHER CARDIAC IMPLANTS AND GRAFTS: ICD-10-CM

## 2020-08-25 DIAGNOSIS — I34.1 NONRHEUMATIC MITRAL (VALVE) PROLAPSE: ICD-10-CM

## 2020-08-25 DIAGNOSIS — I73.9 PERIPHERAL VASCULAR DISEASE, UNSPECIFIED: ICD-10-CM

## 2020-08-25 DIAGNOSIS — G72.0 DRUG-INDUCED MYOPATHY: ICD-10-CM

## 2020-08-25 PROCEDURE — 93000 ELECTROCARDIOGRAM COMPLETE: CPT

## 2020-08-25 PROCEDURE — 99215 OFFICE O/P EST HI 40 MIN: CPT

## 2020-08-25 PROCEDURE — 93306 TTE W/DOPPLER COMPLETE: CPT

## 2020-08-25 PROCEDURE — 93880 EXTRACRANIAL BILAT STUDY: CPT

## 2020-08-25 NOTE — PHYSICAL EXAM
[General Appearance - Well Developed] : well developed [Normal Appearance] : normal appearance [Well Groomed] : well groomed [General Appearance - Well Nourished] : well nourished [No Deformities] : no deformities [General Appearance - In No Acute Distress] : no acute distress [Normal Conjunctiva] : the conjunctiva exhibited no abnormalities [Eyelids - No Xanthelasma] : the eyelids demonstrated no xanthelasmas [Normal Oral Mucosa] : normal oral mucosa [No Oral Pallor] : no oral pallor [Normal Oropharynx] : normal oropharynx [Normal Jugular Venous V Waves Present] : normal jugular venous V waves present [Respiration, Rhythm And Depth] : normal respiratory rhythm and effort [Auscultation Breath Sounds / Voice Sounds] : lungs were clear to auscultation bilaterally [Heart Rate And Rhythm] : heart rate and rhythm were normal [Heart Sounds] : normal S1 and S2 [Murmurs] : no murmurs present [Arterial Pulses Normal] : the arterial pulses were normal [Edema] : no peripheral edema present [Veins - Varicosity Changes] : no varicosital changes were noted in the lower extremities [Bowel Sounds] : normal bowel sounds [Abdomen Soft] : soft [Abnormal Walk] : normal gait [Abdomen Tenderness] : non-tender [Gait - Sufficient For Exercise Testing] : the gait was sufficient for exercise testing [Nail Clubbing] : no clubbing of the fingernails [Cyanosis, Localized] : no localized cyanosis [Skin Turgor] : normal skin turgor [] : no rash [Skin Color & Pigmentation] : normal skin color and pigmentation [Skin Lesions] : no skin lesions [No Venous Stasis] : no venous stasis [No Skin Ulcers] : no skin ulcer [No Xanthoma] : no  xanthoma was observed [Oriented To Time, Place, And Person] : oriented to person, place, and time [Affect] : the affect was normal [Impaired Insight] : insight and judgment were intact [Mood] : the mood was normal [Memory Recent] : recent memory was not impaired [No Anxiety] : not feeling anxious [FreeTextEntry1] : no corneal arcus

## 2020-08-25 NOTE — DISCUSSION/SUMMARY
[Patient] : the patient [Risks] : risks [Benefits] : benefits [___ Month(s)] : [unfilled] month(s) [Alternatives] : alternatives [With Me] : with me [FreeTextEntry1] : 1. CVA- multiple lesions left MCA territory; Recurrent TIA symptoms 8/2017, with PAF on LINQ. S/p PFO closure.  On xarelto.  \par 2. mild to moderate bilateral carotid stenosis.  Continue statin therapy.  LDL goal < 70.  carotid Dupelx. \par 3. PFO -s/p closure, doing well.  No leaks on last echo 7/2019.   repeat echo. \par 4.   atrial fibrillation.  CHADSVASC 3 (CVA, age ).   Continue xarelto.  will get loop recorder explanted. \par 5. ?statin myalgia - Tolerating rosuvastatin.  LDL goal < 70 in setting of carotid disease.  \par 6. 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  \par 7,. hypotension: NO symptoms.  Butler salt. compression  stocking.s \par loop extraction\par claduicaitons: left leg worse: Chris and arterial duplex leg. \par \par   \par \par \par

## 2020-08-25 NOTE — HISTORY OF PRESENT ILLNESS
[FreeTextEntry1] : 64 y M hx  L carotid stenosis 50%, squamous cell CA,  tonsil s/p xrt, neck dissection 2002, KELLY on CPAP, presented to ER 1/2017 , small punctate CVAs left MCA territory, PFO, mild to moderate carotid disease..  No arrhythmias noted on ILR, so he underwent PFO closure 2017.  Shortly after PFO closure, had PAF, now on xarelto.  Carotid dopplers repeated in the hospital showed mild to moderate bilateral disease.  Recurrent TIA symptoms 8/2017.  Ischemia evaluation was negative.  He developed myalgia to lipitor, and has been switched to crestor. \par \par 5/1/18 Had cystoscopy - thickening of the bladder, underwent  Bladder biopsy 4/16.  Noted hematuria "hemorrhaging" , steady, also noted "tissue", spoke with our NP in the office, aspriin and xarelto both stopped last week.  Since then no further hematuria.  PAF last week - short burst - 34 sec, asymptomatic.  Biopsy came back negative.  \par \par 7/10/18 Returns for follow up.  In the interim, restarted on xarelto, no further hematuria. Going to have a colonoscopy in August 2018.  No chest pain or shortness of breath. No palpitations, dizziness, syncope.  \par \par 1/10/19 Returns for follow up.  In the interim, had a mandibular fracture s/p surgical repair late December 2018.  Otherwise no chest pain or shortness of breath.  No palpitations, dizziness, syncope.  \par \par 7/15/19 Returns for follow up.  In the interim, has been feeling well.  No chest pain or shortness of breath.  Started on pentoxifylline 1 month ago.  Has numbness left side of chin and lips.  No palpitations. Echo today showed normal biventricular systolic function.  No leaks around PFO amplatzer device. MR is unchanged. Tripped and fell in April - had hairline fractures of the hip, did not need interventions.  No further hematuria. Has opted to not continue ILR monitoring.  Will think of explant if ever he's in the hospital.  \par \par 2/25/20 Returns for follow up.  In the interim, no falls.  Now retired.  Running 3x a week - 3 miles each time 6 miles per hr.  No complaints.  \par \par 8/25/2020 :\par Chronic conditions are stable and no acute change.\par  hyperlipidemia: at goal on meds. Compliant with meds. reconciliation done.\par Risk for hypertension: Does diet and exericse. \par risk of coronary artery disease:  Compliant with diet and lifestyle management.\par Congestive heart failure: NYHA class II, optimized. Compliant with medications.\par  \par Afib: rate controlled or sinus. \par when he stands up quickly he feels dizziy. \par  \par \par \par

## 2020-09-15 ENCOUNTER — APPOINTMENT (OUTPATIENT)
Dept: CARDIOLOGY | Facility: CLINIC | Age: 65
End: 2020-09-15
Payer: MEDICARE

## 2020-09-15 PROCEDURE — 93015 CV STRESS TEST SUPVJ I&R: CPT

## 2020-09-23 ENCOUNTER — EMERGENCY (EMERGENCY)
Facility: HOSPITAL | Age: 65
LOS: 1 days | Discharge: HOME | End: 2020-09-23
Attending: EMERGENCY MEDICINE
Payer: MEDICARE

## 2020-09-23 VITALS
RESPIRATION RATE: 18 BRPM | TEMPERATURE: 98 F | HEART RATE: 52 BPM | SYSTOLIC BLOOD PRESSURE: 162 MMHG | OXYGEN SATURATION: 99 % | DIASTOLIC BLOOD PRESSURE: 98 MMHG | HEIGHT: 66 IN | WEIGHT: 145.06 LBS

## 2020-09-23 VITALS
OXYGEN SATURATION: 100 % | RESPIRATION RATE: 20 BRPM | HEART RATE: 55 BPM | TEMPERATURE: 98 F | DIASTOLIC BLOOD PRESSURE: 78 MMHG | SYSTOLIC BLOOD PRESSURE: 154 MMHG

## 2020-09-23 DIAGNOSIS — Z98.890 OTHER SPECIFIED POSTPROCEDURAL STATES: Chronic | ICD-10-CM

## 2020-09-23 PROCEDURE — 72125 CT NECK SPINE W/O DYE: CPT | Mod: 26

## 2020-09-23 PROCEDURE — 99284 EMERGENCY DEPT VISIT MOD MDM: CPT

## 2020-09-23 PROCEDURE — 70450 CT HEAD/BRAIN W/O DYE: CPT | Mod: 26

## 2020-09-23 PROCEDURE — 72125 CT NECK SPINE W/O DYE: CPT

## 2020-09-23 PROCEDURE — 70450 CT HEAD/BRAIN W/O DYE: CPT

## 2020-09-23 PROCEDURE — 99284 EMERGENCY DEPT VISIT MOD MDM: CPT | Mod: 25
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
## 2020-09-23 NOTE — ED PROVIDER NOTE - NSFOLLOWUPCLINICS_GEN_ALL_ED_FT
[facility] Concussion [facility]  Concussion  [address] [place] [id number]  Phone: [phone]  Fax:   Follow Up Time: 4-6 Days

## 2020-09-23 NOTE — ED PROVIDER NOTE - NSFOLLOWUPINSTRUCTIONS_ED_ALL_ED_FT
-- Based on the events which brought you to the ER today, it is possible that you may have a concussion.  A concussion occurs when there is a blow to the head or body, with enough force to shake the brain and disrupt how the brain functions.   -- You may experience symptoms such as headaches, sensitivity to light/noise, dizziness, cognitive slowing, difficulty concentrating/remembering, trouble sleeping and drowsiness.  These symptoms may last anywhere from hours/days to potentially weeks/months.    While these symptoms are very frustrating and perhaps debilitating, it is important that you remember that they will improve over time.  Everyone has a different rate of recovery; it is difficult to predict when your symptoms will resolve.  -- In order to allow for your brain to heal after the injury, we recommend that you see your primary physician or a physician knowledgeable in concussion management.    -- See information for concussion clinic above.   -- We also advise you to let your body and brain rest: avoid physical activities (sports, gym, and exercise) and reduce cognitive demands (reading, texting, TV watching, computer use, video games, etc).  School attendance, after-school activities and work may need to be modified to avoid increasing symptoms. We recommend against driving until until all symptoms have resolved.    -- You should take 650mg of Acetaminophen (Tylenol) every 4 hours as needed for pain control; however, taking anti-inflammatory medication (Motrin/Advil/Ibuprofen) is not advised.  -- Come back to the ER right away if you are having repeated episodes of vomiting, severe/worsening headache/dizziness or any other symptom that alarms you.      Closed Head Injury    A closed head injury is an injury to your head that may or may not involve a traumatic brain injury (TBI). Symptoms of TBI can be short or long lasting and include headache, dizziness, interference with memory or speech, fatigue, confusion, changes in sleep, mood changes, nausea, depression/anxiety, and dulling of senses. Make sure to obtain proper rest which includes getting plenty of sleep, avoiding excessive visual stimulation, and avoiding activities that may cause physical or mental stress. Avoid any situation where there is potential for another head injury, including sports.    SEEK IMMEDIATE MEDICAL CARE IF YOU HAVE ANY OF THE FOLLOWING SYMPTOMS: unusual drowsiness, vomiting, severe dizziness, seizures, lightheadedness, muscular weakness, different pupil sizes, visual changes, or clear or bloody discharge from your ears or nose.

## 2020-09-23 NOTE — ED PROVIDER NOTE - PMH
Afib    Benign prostatic hyperplasia, presence of lower urinary tract symptoms unspecified, unspecified morphology    Squamous cell cancer of hypopharynx
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
## 2020-09-23 NOTE — ED PROVIDER NOTE - CLINICAL SUMMARY MEDICAL DECISION MAKING FREE TEXT BOX
Patient with head trauma on xarelto. Well appearing, normal VS, normal exam. Priority CT called at walk in. Likely concussion, will follow up CTs to r/o bleed. Reassess.

## 2020-09-23 NOTE — ED PROVIDER NOTE - PHYSICAL EXAMINATION
Gen: Well appearing in NAD  Head: NC/AT, no abrasions/laceration, no stepoff  Neck: trachea midline  Resp:  No distress, CTAB  CV: RRR  GI: soft, NTND  Ext: no deformities. Well healed abrasion to R elbow, no bony ttp. FROM of all extremities. No spinal ttp. Pelvis stable.   Neuro: CN 2-12 intact, No tremors. No fasciculations. No nystagmus. Normal finger to nose and heel to shin b/l. No dysmetria.  Normal gait. Normal sensation. Normal strength.   Skin:  Warm and dry as visualized  Psych:  Normal affect and mood

## 2020-09-23 NOTE — ED PROVIDER NOTE - PATIENT PORTAL LINK FT
You can access the FollowMyHealth Patient Portal offered by [facility] by registering at the following website: [email or web address]. By joining [facility]’s FollowMyHealth portal, you will also be able to view your health information using other applications (apps) compatible with our system.

## 2020-09-25 ENCOUNTER — APPOINTMENT (OUTPATIENT)
Dept: CARDIOLOGY | Facility: CLINIC | Age: 65
End: 2020-09-25
Payer: MEDICARE

## 2020-09-25 PROCEDURE — 93923 UPR/LXTR ART STDY 3+ LVLS: CPT

## 2020-09-25 PROCEDURE — 93925 LOWER EXTREMITY STUDY: CPT

## 2020-10-30 ENCOUNTER — TRANSCRIPTION ENCOUNTER (OUTPATIENT)
Age: 65
End: 2020-10-30

## 2020-11-10 RX ORDER — RIVAROXABAN 20 MG/1
20 TABLET, FILM COATED ORAL
Qty: 90 | Refills: 3 | Status: ACTIVE | COMMUNITY
Start: 2017-08-21 | End: 1900-01-01

## 2020-11-18 ENCOUNTER — TRANSCRIPTION ENCOUNTER (OUTPATIENT)
Age: 65
End: 2020-11-18

## 2021-03-02 ENCOUNTER — APPOINTMENT (OUTPATIENT)
Dept: CARDIOLOGY | Facility: CLINIC | Age: 66
End: 2021-03-02

## 2021-03-09 NOTE — DISCHARGE NOTE ADULT - CARE PROVIDER_API CALL
Duration Of Freeze Thaw-Cycle (Seconds): 7
Number Of Freeze-Thaw Cycles: 2 freeze-thaw cycles
Michael Mccabe), Cardiovascular Disease; Internal Medicine; Interventional Cardiology  [address]  [place] [address]  Phone: [phone]  Fax: [phone]
Post-Care Instructions: I reviewed with the patient in detail post-care instructions. Patient is to wear sunprotection, and avoid picking at any of the treated lesions. Pt may apply Vaseline to crusted or scabbing areas.
Render Note In Bullet Format When Appropriate: No
Consent: The patient's consent was obtained including but not limited to risks of crusting, scabbing, blistering, scarring, darker or lighter pigmentary change, recurrence, incomplete removal and infection.
Detail Level: Simple
Render Post-Care Instructions In Note?: yes

## 2021-05-25 RX ORDER — ROSUVASTATIN CALCIUM 5 MG/1
5 TABLET, FILM COATED ORAL
Qty: 90 | Refills: 1 | Status: ACTIVE | COMMUNITY
Start: 2017-11-21 | End: 1900-01-01

## 2021-11-19 ENCOUNTER — RX RENEWAL (OUTPATIENT)
Age: 66
End: 2021-11-19

## 2021-11-19 NOTE — ED PROVIDER NOTE - SECONDARY DIAGNOSIS.
Airway       Patient location during procedure: OR       Procedure Start/Stop Times: 11/19/2021 10:23 AM and 11/19/2021 10:26 AM  Staff -        Anesthesiologist:  Cherelle Cardona MD       CRNA: Christie Holland APRN CRNA       Performed By: CRNAIndications and Patient Condition       Indications for airway management: yordy-procedural       Induction type:intravenous       Mask difficulty assessment: 0 - not attempted    Final Airway Details       Final airway type: supraglottic airway    Supraglottic Airway Details        Type: LMA       Brand: Ambu AuraGain       LMA size: 4    Post intubation assessment        Placement verified by: capnometry, equal breath sounds and chest rise        Number of attempts at approach: 2       Number of other approaches attempted: 0       Secured with: silk tape       Ease of procedure: easy       Dentition: Intact          
Intrathecal injection Procedure Note    Pre-Procedure   Staff -        Anesthesiologist:  Cherelle Cardona MD       Performed By: anesthesiologist       Location: OR       Procedure Start/Stop Times: 11/19/2021 10:08 AM and 11/19/2021 10:18 AM       Pre-Anesthestic Checklist: patient identified, IV checked, risks and benefits discussed, informed consent, monitors and equipment checked, pre-op evaluation, at physician/surgeon's request and post-op pain management  Timeout:       Correct Patient: Yes        Correct Procedure: Yes        Correct Site: Yes        Correct Position: Yes   Procedure Documentation  Procedure: intrathecal injection       Patient Position: sitting       Patient Prep/Sterile Barriers: sterile gloves, mask, patient draped       Skin prep: Chloraprep       Insertion Site: L2-3. (midline approach).       Needle Gauge: 24.        Needle Length (Inches): 4        Spinal Needle Type: Pencan       Introducer used       Introducer: 20 G       # of attempts: 2 and  # of redirects:  2    Assessment/Narrative         Paresthesias: No.       Sensory Level: T10       CSF fluid: clear.      Opening pressure was cmH2O while  Sitting.      Medication(s) Administered   0.75% Hyperbaric Bupivacaine (Intrathecal), 1.4 mL  Medication Administration Time: 11/19/2021 10:16 AM     Comments:  Pt supine for 5 minutes for LMA induction prior to turning left lateral for procedure.           
Dizziness

## 2023-02-28 PROBLEM — I48.91 UNSPECIFIED ATRIAL FIBRILLATION: Chronic | Status: ACTIVE | Noted: 2020-09-23

## 2023-04-05 PROBLEM — G72.0 STATIN MYOPATHY: Status: ACTIVE | Noted: 2017-11-21

## 2023-04-11 ENCOUNTER — APPOINTMENT (OUTPATIENT)
Dept: ORTHOPEDIC SURGERY | Facility: CLINIC | Age: 68
End: 2023-04-11
Payer: MEDICARE

## 2023-04-11 VITALS — BODY MASS INDEX: 23.3 KG/M2 | WEIGHT: 145 LBS | HEIGHT: 66 IN

## 2023-04-11 DIAGNOSIS — F12.91 CANNABIS USE, UNSPECIFIED, IN REMISSION: ICD-10-CM

## 2023-04-11 DIAGNOSIS — M11.261 OTHER CHONDROCALCINOSIS, RIGHT KNEE: ICD-10-CM

## 2023-04-11 DIAGNOSIS — M17.11 UNILATERAL PRIMARY OSTEOARTHRITIS, RIGHT KNEE: ICD-10-CM

## 2023-04-11 PROCEDURE — 73562 X-RAY EXAM OF KNEE 3: CPT | Mod: RT

## 2023-04-11 PROCEDURE — 99203 OFFICE O/P NEW LOW 30 MIN: CPT

## 2023-04-11 RX ORDER — LISINOPRIL 30 MG/1
TABLET ORAL
Refills: 0 | Status: ACTIVE | COMMUNITY

## 2023-04-11 RX ORDER — CLONAZEPAM 0.5 MG/1
0.5 TABLET ORAL
Refills: 0 | Status: DISCONTINUED | COMMUNITY
End: 2023-04-11

## 2023-04-11 NOTE — DISCUSSION/SUMMARY
[de-identified] : Patient is functional\par Discussed remaining functional through non impact exercises and stretches, icing, anti inflammatories\par Discussed options including CSI, HA injections, PT\par CSI is not recommended at this time because pt is functional, his knee isn't inflamed\par Discussed gel injections - pt will think about it. If pt wants to proceed with gel injections, he will call and make an appt to get inj with Yvon\par f/u prn \par \par Briefly discussed TKA procedure and recovery

## 2023-04-11 NOTE — HISTORY OF PRESENT ILLNESS
[de-identified] : Date of Injury/Onset:    4 years  \par Pain: At Rest: 5 /10   \par With Activity: 8 /10 \par Affecting Sleep: N\par Difficulty with stairs:Y\par Difficulty getting in and out of car:N\par Sit to stand stiffness:Y\par Mechanism of injury:  NKI\par This  is not a Work Related Injury being treated under Worker's Compensation.\par This  is not   an athletic injury occurring associated with an interscholastic or organized sports team.\par Quality of symptoms: C/O MEDIAL SIDED PAIN, SWELLING, INSTABILITY EPISODES\par Improves with:   REST \par Worse with:    RUNNING/EXERCISE\par Previous Treatment/Imaging/Studies Since Last Visit: arthroscopy x 2 -SB (5-7 yrs ago),  NO NSAIDS D/T BLOOD THINNERS, SWIMMING EXERCISE PROGRAM \par Reports Available For Review Today: NONE\par \par  \par \par

## 2023-04-11 NOTE — PHYSICAL EXAM
[NL (140)] : flexion 140 degrees [NL (0)] : extension 0 degrees [] : patient ambulates without assistive device [Right] : right knee [AP] : anteroposterior [Lateral] : lateral [[place]] : skyline [Moderate tricompartmental OA medial narrowing] : Moderate tricompartmental OA medial narrowing [FreeTextEntry8] : crepitus in medial compartment  [de-identified] : VARUS GAIT, SLIGHT FLEXED KNEE GAIT  [FreeTextEntry9] : more than 50% bone loss in medial compartment, calcifications laterally, medial spurring, calcific bodies in posterior aspect of knee [de-identified] : False

## 2024-02-14 NOTE — ED ADULT NURSE NOTE - RESPIRATORY WDL
"  [facility].        She eats 4 or more servings of fruits and vegetables daily.She consumes 0 sweetened beverage(s) daily.She exercises with enough effort to increase her heart rate 9 or less minutes per day.  She exercises with enough effort to increase her heart rate 3 or less days per week.   She is taking medications regularly.     Hyperlipidemia Follow-Up    Are you regularly taking any medication or supplement to lower your  "cholesterol?   No  Are you having muscle aches or other side effects that you think could be caused by your cholesterol lowering medication?  No - N/A            Objective    BP (!) 140/88 (BP Location: Right arm, Patient Position: Sitting)   Pulse 71   Temp 98.3  F (36.8  C)   Ht 1.676 m (5' 6\")   Wt 93.7 kg (206 lb 8 oz)   LMP  (LMP Unknown)   SpO2 97%   Breastfeeding No   BMI 33.33 kg/m    Body mass index is 33.33 kg/m .  Physical Exam   GENERAL: alert and no distress  RESP: lungs clear to auscultation - no rales, rhonchi or wheezes  CV: regular rate and rhythm, normal S1 S2, no S3 or S4, no murmur, click or rub, no peripheral edema  MS: no gross musculoskeletal defects noted, no edema            Signed Electronically by: Michelle Nicole NP    " Breathing spontaneous and unlabored. Breath sounds clear and equal bilaterally with regular rhythm.